# Patient Record
Sex: MALE | Race: WHITE | HISPANIC OR LATINO | Employment: FULL TIME | URBAN - METROPOLITAN AREA
[De-identification: names, ages, dates, MRNs, and addresses within clinical notes are randomized per-mention and may not be internally consistent; named-entity substitution may affect disease eponyms.]

---

## 2017-01-11 ENCOUNTER — HOSPITAL ENCOUNTER (OUTPATIENT)
Dept: RADIOLOGY | Facility: MEDICAL CENTER | Age: 62
End: 2017-01-11
Attending: PHYSICIAN ASSISTANT
Payer: COMMERCIAL

## 2017-01-11 DIAGNOSIS — R10.9 LEFT SIDED ABDOMINAL PAIN: ICD-10-CM

## 2017-01-11 PROCEDURE — 76705 ECHO EXAM OF ABDOMEN: CPT

## 2017-01-13 ENCOUNTER — TELEPHONE (OUTPATIENT)
Dept: MEDICAL GROUP | Facility: MEDICAL CENTER | Age: 62
End: 2017-01-13

## 2017-01-13 NOTE — TELEPHONE ENCOUNTER
----- Message from Julia Bello PA-C sent at 1/11/2017  5:51 PM PST -----  Cook Islander is 1st language.   Please inform pt that his ultrasound was completely normal.   Please ask him if he is still having any pain.   Thank you  Julia

## 2017-06-14 ENCOUNTER — HOSPITAL ENCOUNTER (OUTPATIENT)
Dept: LAB | Facility: MEDICAL CENTER | Age: 62
End: 2017-06-14
Attending: PHYSICIAN ASSISTANT
Payer: COMMERCIAL

## 2017-06-14 DIAGNOSIS — E78.5 DYSLIPIDEMIA: ICD-10-CM

## 2017-06-14 DIAGNOSIS — R74.8 ELEVATED ALKALINE PHOSPHATASE LEVEL: ICD-10-CM

## 2017-06-14 LAB
ALBUMIN SERPL BCP-MCNC: 3.8 G/DL (ref 3.2–4.9)
ALBUMIN/GLOB SERPL: 1.5 G/DL
ALP SERPL-CCNC: 114 U/L (ref 30–99)
ALT SERPL-CCNC: 17 U/L (ref 2–50)
ANION GAP SERPL CALC-SCNC: 5 MMOL/L (ref 0–11.9)
AST SERPL-CCNC: 15 U/L (ref 12–45)
BILIRUB SERPL-MCNC: 1.2 MG/DL (ref 0.1–1.5)
BUN SERPL-MCNC: 15 MG/DL (ref 8–22)
CALCIUM SERPL-MCNC: 9.4 MG/DL (ref 8.5–10.5)
CHLORIDE SERPL-SCNC: 108 MMOL/L (ref 96–112)
CHOLEST SERPL-MCNC: 200 MG/DL (ref 100–199)
CO2 SERPL-SCNC: 28 MMOL/L (ref 20–33)
CREAT SERPL-MCNC: 0.77 MG/DL (ref 0.5–1.4)
GFR SERPL CREATININE-BSD FRML MDRD: >60 ML/MIN/1.73 M 2
GGT SERPL-CCNC: 48 U/L (ref 7–51)
GLOBULIN SER CALC-MCNC: 2.6 G/DL (ref 1.9–3.5)
GLUCOSE SERPL-MCNC: 103 MG/DL (ref 65–99)
HDLC SERPL-MCNC: 57 MG/DL
LDLC SERPL CALC-MCNC: 112 MG/DL
POTASSIUM SERPL-SCNC: 4.2 MMOL/L (ref 3.6–5.5)
PROT SERPL-MCNC: 6.4 G/DL (ref 6–8.2)
SODIUM SERPL-SCNC: 141 MMOL/L (ref 135–145)
TRIGL SERPL-MCNC: 154 MG/DL (ref 0–149)

## 2017-06-14 PROCEDURE — 36415 COLL VENOUS BLD VENIPUNCTURE: CPT

## 2017-06-14 PROCEDURE — 82977 ASSAY OF GGT: CPT

## 2017-06-14 PROCEDURE — 80061 LIPID PANEL: CPT

## 2017-06-14 PROCEDURE — 80053 COMPREHEN METABOLIC PANEL: CPT

## 2017-06-15 ENCOUNTER — TELEPHONE (OUTPATIENT)
Dept: MEDICAL GROUP | Facility: MEDICAL CENTER | Age: 62
End: 2017-06-15

## 2017-06-15 NOTE — TELEPHONE ENCOUNTER
----- Message from Julia Bello PA-C sent at 6/15/2017  9:51 AM PDT -----  We will discuss his labs at his next appointment 6/21/17.  Thank you  Julia

## 2017-06-20 ENCOUNTER — TELEPHONE (OUTPATIENT)
Dept: MEDICAL GROUP | Facility: MEDICAL CENTER | Age: 62
End: 2017-06-20

## 2017-06-20 NOTE — TELEPHONE ENCOUNTER
ESTABLISHED PATIENT PRE-VISIT PLANNING     Note: Patient will not be contacted if there is no indication to call.     1.  Reviewed notes from the last few office visits within the medical group: Yes    2.  If any orders were placed at last visit or intended to be done for this visit (i.e. 6 mos follow-up), do we have Results/Consult Notes?        •  Labs - Labs ordered, completed and results are in chart.       •  Imaging - Imaging ordered, completed and results are in chart.       •  Referrals - No referrals were ordered at last office visit.    3. Is this appointment scheduled as a Hospital Follow-Up? No    4.  Immunizations were updated in Epic using WebIZ?: No WebIZ record       •  Web Iz Recommendations: n/a    5.  Patient is due for the following Health Maintenance Topics:   Health Maintenance Due   Topic Date Due   • IMM DTaP/Tdap/Td Vaccine (1 - Tdap) 06/16/1973   • COLONOSCOPY  06/16/2004   • IMM ZOSTER VACCINE  06/16/2014           6.  Patient was informed to arrive 15 min prior to their scheduled appointment and bring in their medication bottles.      Left msg for patient reminding them of appt. MA please ask about the HM topics during appt.

## 2017-06-21 ENCOUNTER — OFFICE VISIT (OUTPATIENT)
Dept: MEDICAL GROUP | Facility: MEDICAL CENTER | Age: 62
End: 2017-06-21
Payer: COMMERCIAL

## 2017-06-21 VITALS
WEIGHT: 130.73 LBS | BODY MASS INDEX: 21.78 KG/M2 | RESPIRATION RATE: 14 BRPM | HEART RATE: 78 BPM | HEIGHT: 65 IN | SYSTOLIC BLOOD PRESSURE: 110 MMHG | OXYGEN SATURATION: 96 % | DIASTOLIC BLOOD PRESSURE: 64 MMHG | TEMPERATURE: 98.3 F

## 2017-06-21 DIAGNOSIS — R74.8 ELEVATED ALKALINE PHOSPHATASE LEVEL: ICD-10-CM

## 2017-06-21 DIAGNOSIS — E78.5 DYSLIPIDEMIA: ICD-10-CM

## 2017-06-21 DIAGNOSIS — R73.01 ELEVATED FASTING GLUCOSE: ICD-10-CM

## 2017-06-21 PROCEDURE — 99214 OFFICE O/P EST MOD 30 MIN: CPT | Performed by: PHYSICIAN ASSISTANT

## 2017-06-21 ASSESSMENT — PAIN SCALES - GENERAL: PAINLEVEL: NO PAIN

## 2017-06-21 NOTE — PROGRESS NOTES
Subjective:     Chief Complaint   Patient presents with   • Follow-Up     labs     Mikal Bui is a 62 y.o. male here today for elevated alkaline phosphatase, elevated fasting blood glucose, cholesterol as listed below    Elevated alkaline phosphatase level  Results for MIKAL BUI (MRN [id number]) as of 6/21/2017 10:14   Ref. Range 5/30/2013 07:35 11/30/2016 11:10 6/14/2017 10:08   Alkaline Phosphatase Latest Ref Range: 30-99 U/L 129 (H) 104 (H) 114 (H)   6/14/17 GGT 48.   Otherwise normal LFTs.   Denies bone pain, joint pain, fever, chills, fatigue.     Elevated fasting glucose  First time elevated fasting blood glucose.  6/2017 fasting blood glucose 103  Per patient and brother he did increase his sugar intake she is having a lot more sweets throughout the day to help him stay motivated since he works from 6am to 11 PM most days of the week.   He is constantly moving and on his feet.  Otherwise his diet is pretty healthy other than his sweet tooth.  Denies polyuria, polydipsia, polyphagia    Dyslipidemia  Results for MIKAL BUI (MRN [id number]) as of 6/21/2017 10:14   Ref. Range 11/30/2016 11:10 6/14/2017 10:08   Cholesterol,Tot Latest Ref Range: 100-199 mg/dL 230 (H) 200 (H)   Triglycerides Latest Ref Range: 0-149 mg/dL 204 (H) 154 (H)   HDL Latest Ref Range: >=40 mg/dL 61 57   LDL Latest Ref Range: <100 mg/dL 128 (H) 112 (H)   Per patient and brother he did increase his sugar intake she is having a lot more sweets throughout the day to help him stay motivated since he works from 6am to 11 PM most days of the week.   He is constantly moving and on his feet.  Otherwise his diet is pretty healthy other than his sweet tooth.  Denies chest pain, shortness of breath, lightheadedness, muscle cramping           Current medicines (including changes today)  Current Outpatient Prescriptions   Medication Sig Dispense Refill   • predniSONE (DELTASONE) 20 MG TABS Take with food 21  "Tab 0   • Pseudoeph-Doxylamine-DM-APAP (NYQUIL MULTI-SYMPTOM PO) Take  by mouth.       • Naproxen Sodium (ALEVE PO) Take  by mouth.       • azithromycin (ZITHROMAX) 250 MG TABS 2 tabs by mouth day 1, 1 tab by mouth days 2-5 6 Tab 0   • guaifenesin-codeine (ROBITUSSIN AC) SOLN Take 5 mL by mouth every 6 hours as needed for Cough. 100 mL 0     No current facility-administered medications for this visit.     He  has a past medical history of Hyperlipidemia. He also has no past medical history of Hypertension or Diabetes.    ROS   No chest pain, no shortness of breath, no abdominal pain       Objective:     Blood pressure 110/64, pulse 78, temperature 36.8 °C (98.3 °F), resp. rate 14, height 1.651 m (5' 5\"), weight 59.3 kg (130 lb 11.7 oz), SpO2 96 %. Body mass index is 21.76 kg/(m^2).   Physical Exam:  Alert, oriented in no acute distress.  Eye contact is good, speech goal directed, affect calm  HEENT: conjunctiva non-injected, sclera non-icteric, PERRL.  Neck No adenopathy or masses in the neck or supraclavicular regions.  Lungs: clear to auscultation bilaterally with good excursion.  CV: regular rate and rhythm.  Abdomen: soft, nontender, no HSM, No CVAT  Ext: no edema, color normal, peripheral pulses 2+, temperature normal      Assessment and Plan:   The following treatment plan was discussed     1. Elevated alkaline phosphatase level  Persistent with normal ggt.   Ordered some labs and referral for endocrinology   - COMP METABOLIC PANEL; Future  - TSH; Future  - FREE THYROXINE; Future  - ALK PHOS, BONE SPEC.; Future  - REFERRAL TO ENDOCRINOLOGY    2. Dyslipidemia  Improved, continue working on diet and exercise, discussed decreasing sweets way down.     3. Elevated fasting glucose  New diagnosis, first-time elevated, labs ordered, will call for results  continue working on diet and exercise, discussed decreasing sweets way down.   - COMP METABOLIC PANEL; Future  - HEMOGLOBIN A1C; Future      Followup: Return in " about 6 months (around 12/21/2017) for annual.           Please note that this dictation was created using voice recognition software. I have made every reasonable attempt to correct obvious errors, but I expect that there are errors of grammar and possibly content that I did not discover before finalizing the note.

## 2017-06-21 NOTE — MR AVS SNAPSHOT
"        Mikal Bhardwaj-Frederic   2017 9:40 AM   Office Visit   MRN: [id number]    Department:  [place] [address]   Dept Phone:  [phone]    Description:  Male : 1955   Provider:  Julia Bello PA-C           Reason for Visit     Follow-Up labs      Allergies as of 2017     Allergen Noted Reactions    Pcn [Penicillins] 2016   Diarrhea    Abdominal discomfort      You were diagnosed with     Elevated alkaline phosphatase level   [861638]       Dyslipidemia   [435190]       Elevated fasting glucose   [789259]         Vital Signs     Blood Pressure Pulse Temperature Respirations Height Weight    110/64 mmHg 78 36.8 °C (98.3 °F) 14 1.651 m (5' 5\") 59.3 kg (130 lb 11.7 oz)    Body Mass Index Oxygen Saturation Smoking Status             21.76 kg/m2 96% Current Every Day Smoker         Basic Information     Date Of Birth Sex Race Ethnicity Preferred Language    1955 Male  or   Origin (Croatian,Canadian,Stateless,Jordanian, etc) English      Problem List              ICD-10-CM Priority Class Noted - Resolved    Left sided abdominal pain R10.9   2016 - Present    Dyslipidemia E78.5   2016 - Present    Elevated alkaline phosphatase level R74.8   2017 - Present    Elevated fasting glucose R73.01   2017 - Present      Health Maintenance        Date Due Completion Dates    IMM DTaP/Tdap/Td Vaccine (1 - Tdap) 1973 ---    COLONOSCOPY 2004 ---    IMM ZOSTER VACCINE 2014 ---            Current Immunizations     No immunizations on file.      Below and/or attached are the medications your provider expects you to take. Review all of your home medications and newly ordered medications with your provider and/or pharmacist. Follow medication instructions as directed by your provider and/or pharmacist. Please keep your medication list with you and share with your provider. Update the information when medications are discontinued, doses are " changed, or new medications (including over-the-counter products) are added; and carry medication information at all times in the event of emergency situations     Allergies:  PCN - Diarrhea               Medications  Valid as of: June 21, 2017 - 10:42 AM    Generic Name Brand Name Tablet Size Instructions for use    Azithromycin (Tab) ZITHROMAX 250 MG 2 tabs by mouth day 1, 1 tab by mouth days 2-5        Guaifenesin-Codeine (Solution) ROBITUSSIN -10 mg/5mL Take 5 mL by mouth every 6 hours as needed for Cough.        Naproxen Sodium   Take  by mouth.          PredniSONE (Tab) DELTASONE 20 MG Take with food        Pseudoeph-Doxylamine-DM-APAP   Take  by mouth.          .                 Medicines prescribed today were sent to:     [facility] [address] [place] [address], [place] - [address] [address]    Phone: [phone] Fax: [phone]    Open 24 Hours?: No      Medication refill instructions:       If your prescription bottle indicates you have medication refills left, it is not necessary to call your provider’s office. Please contact your pharmacy and they will refill your medication.    If your prescription bottle indicates you do not have any refills left, you may request refills at any time through one of the following ways: The online [employer] system (except Urgent Care), by calling your provider’s office, or by asking your pharmacy to contact your provider’s office with a refill request. Medication refills are processed only during regular business hours and may not be available until the next business day. Your provider may request additional information or to have a follow-up visit with you prior to refilling your medication.   *Please Note: Medication refills are assigned a new Rx number when refilled electronically. Your pharmacy may indicate that no refills were authorized even though a new prescription for the same medication is available at the pharmacy. Please  request the medicine by name with the pharmacy before contacting your provider for a refill.        Your To Do List     Future Labs/Procedures Complete By Expires    ALK PHOS, BONE SPEC.  As directed 6/21/2018    COMP METABOLIC PANEL  As directed 6/22/2018    FREE THYROXINE  As directed 6/22/2018    HEMOGLOBIN A1C  As directed 6/22/2018    TSH  As directed 6/22/2018      Referral     A referral request has been sent to our patient care coordination department. Please allow 3-5 business days for us to process this request and contact you either by phone or mail. If you do not hear from us by the 5th business day, please call us at [phone].           MyChart Status: Patient Declined        Quit Tobacco Information     Do you want to quit using tobacco?    Quitting tobacco decreases risks of cancer, heart and lung disease, increases life expectancy, improves sense of taste and smell, and increases spending money, among other benefits.    If you are thinking about quitting, we can help.  • [facility] Quit Tobacco Program: 349.886.2431  o Program occurs weekly for four weeks and includes pharmacist consultation on products to support quitting smoking or chewing tobacco. A provider referral is needed for pharmacist consultation.  • Tobacco Users Help Hotline: 9-214-QUIT-NOW (250-1206) or https://[place].quitlogix.org/  o Free, confidential telephone and online coaching for [place] residents. Sessions are designed on a schedule that is convenient for you. Eligible clients receive free nicotine replacement therapy.  • Nationally: www.smokefree.gov  o Information and professional assistance to support both immediate and long-term needs as you become, and remain, a non-smoker. Smokefree.gov allows you to choose the help that best fits your needs.

## 2017-06-21 NOTE — ASSESSMENT & PLAN NOTE
Results for LORENA BUI (MRN [id number]) as of 6/21/2017 10:14   Ref. Range 11/30/2016 11:10 6/14/2017 10:08   Cholesterol,Tot Latest Ref Range: 100-199 mg/dL 230 (H) 200 (H)   Triglycerides Latest Ref Range: 0-149 mg/dL 204 (H) 154 (H)   HDL Latest Ref Range: >=40 mg/dL 61 57   LDL Latest Ref Range: <100 mg/dL 128 (H) 112 (H)   Per patient and brother he did increase his sugar intake she is having a lot more sweets throughout the day to help him stay motivated since he works from 6am to 11 PM most days of the week.   He is constantly moving and on his feet.  Otherwise his diet is pretty healthy other than his sweet tooth.  Denies chest pain, shortness of breath, lightheadedness, muscle cramping

## 2017-06-21 NOTE — ASSESSMENT & PLAN NOTE
Results for LORENA BUI (MRN [id number]) as of 6/21/2017 10:14   Ref. Range 5/30/2013 07:35 11/30/2016 11:10 6/14/2017 10:08   Alkaline Phosphatase Latest Ref Range: 30-99 U/L 129 (H) 104 (H) 114 (H)   6/14/17 GGT 48.   Otherwise normal LFTs.   Denies bone pain, joint pain, fever, chills, fatigue.

## 2017-06-21 NOTE — ASSESSMENT & PLAN NOTE
First time elevated fasting blood glucose.  6/2017 fasting blood glucose 103  Per patient and brother he did increase his sugar intake she is having a lot more sweets throughout the day to help him stay motivated since he works from 6am to 11 PM most days of the week.   He is constantly moving and on his feet.  Otherwise his diet is pretty healthy other than his sweet tooth.  Denies polyuria, polydipsia, polyphagia

## 2017-08-23 ENCOUNTER — HOSPITAL ENCOUNTER (OUTPATIENT)
Dept: LAB | Facility: MEDICAL CENTER | Age: 62
End: 2017-08-23
Attending: INTERNAL MEDICINE
Payer: COMMERCIAL

## 2017-08-23 ENCOUNTER — OFFICE VISIT (OUTPATIENT)
Dept: ENDOCRINOLOGY | Facility: MEDICAL CENTER | Age: 62
End: 2017-08-23
Payer: COMMERCIAL

## 2017-08-23 VITALS
SYSTOLIC BLOOD PRESSURE: 108 MMHG | HEART RATE: 71 BPM | WEIGHT: 128 LBS | DIASTOLIC BLOOD PRESSURE: 72 MMHG | OXYGEN SATURATION: 97 % | BODY MASS INDEX: 21.33 KG/M2 | HEIGHT: 65 IN

## 2017-08-23 DIAGNOSIS — E55.9 VITAMIN D DEFICIENCY: ICD-10-CM

## 2017-08-23 DIAGNOSIS — E53.8 VITAMIN B12 DEFICIENCY: ICD-10-CM

## 2017-08-23 DIAGNOSIS — R74.8 ELEVATED ALKALINE PHOSPHATASE LEVEL: ICD-10-CM

## 2017-08-23 LAB
25(OH)D3 SERPL-MCNC: 9 NG/ML (ref 30–100)
VIT B12 SERPL-MCNC: 209 PG/ML (ref 211–911)

## 2017-08-23 PROCEDURE — 82306 VITAMIN D 25 HYDROXY: CPT

## 2017-08-23 PROCEDURE — 36415 COLL VENOUS BLD VENIPUNCTURE: CPT

## 2017-08-23 PROCEDURE — 84080 ASSAY ALKALINE PHOSPHATASES: CPT

## 2017-08-23 PROCEDURE — 84075 ASSAY ALKALINE PHOSPHATASE: CPT

## 2017-08-23 PROCEDURE — 99204 OFFICE O/P NEW MOD 45 MIN: CPT | Performed by: INTERNAL MEDICINE

## 2017-08-23 PROCEDURE — 82607 VITAMIN B-12: CPT

## 2017-08-23 NOTE — PROGRESS NOTES
"New Patient Consult Note  Primary care physician: Julia Bello PA-C    Reason for consult: Elevated alkaline phosphatase    HPI:  Mikal Rushing is a 62 y.o. old patient who comes in today for evaluation of elevated alkaline phosphatase. GGT seems to be normal and so it appears that this alkaline phosphatase is coming from sources other than the liver. He denies any other complaints. He smokes about 4-5 cigarettes daily.    ROS:  Constitutional: No weight loss  Cardiac: No palpitations or racing heart  Resp: No shortness of breath  Neuro: No numbness or tinging in feet  Endo: No heat or cold intolerance, no polyuria or polydipsia  All other systems were reviewed and were negative.    Past Medical History:  Patient Active Problem List    Diagnosis Date Noted   • Elevated alkaline phosphatase level 06/21/2017   • Elevated fasting glucose 06/21/2017   • Dyslipidemia 12/07/2016   • Left sided abdominal pain 11/23/2016       Past Surgical History:  History reviewed. No pertinent past surgical history.    Allergies:  Pcn    Social History:  Social History     Social History   • Marital Status: Single     Spouse Name: N/A   • Number of Children: N/A   • Years of Education: N/A     Occupational History   • Not on file.     Social History Main Topics   • Smoking status: Current Every Day Smoker -- 0.50 packs/day for 20 years     Types: Cigarettes   • Smokeless tobacco: Never Used   • Alcohol Use: Yes      Comment: occ   • Drug Use: No   • Sexual Activity: Not on file     Other Topics Concern   • Not on file     Social History Narrative       Family History:  Family History   Problem Relation Age of Onset   • Hypertension Mother    • Diabetes Father    • Diabetes Sister    • Diabetes Brother    • Diabetes Brother    • Diabetes Brother        Medications:  No current outpatient prescriptions on file.    Labs: Reviewed    Physical Examination:  Vital signs: /72 mmHg  Pulse 71  Ht 1.651 m (5' 5\")  Wt " 58.06 kg (128 lb)  BMI 21.30 kg/m2  SpO2 97% Body mass index is 21.3 kg/(m^2).  General: No apparent distress, cooperative  Eyes: No scleral icterus or discharge  ENMT: Normal on external inspection of nose, lips, normal thyroid exam  Neck: No abnormal masses on inspection  Resp: Normal effort, clear to auscultation bilaterally   CVS: Regular rate and rhythm, S1 S2 normal, no murmur   Extremities: No edema  Abdomen: no abdominal obesity present  Neuro: Alert and oriented  Skin: No rash  Psych: Normal mood and affect, intact memory and able to make informed decisions    Assessment and Plan:    1. Elevated alkaline phosphatase level  We will do  - ALKALINE PHOSPHATASE ISOENZYMES for further evaluation. Rule out vitamin D deficiency as a contributory factor for elevated alkaline phosphatase.    2. Vitamin D deficiency  Plan as per above.    3. Vitamin B12 deficiency  Rule out vitamin B12 deficiency.    4. Smoking Cessation: Smoking cessation was discussed with the patient. He is agreeable to try to quit. I have advised him to discuss smoking cessation plan in detail with Dr. Bello.      Return in about 2 months (around 10/23/2017).     Thank you for allowing me to participate in the care of this patient.    Oniel Jacobsen M.D.  08/23/2017    CC:   Julia Bello PA-C    This note was created using voice recognition software (Dragon). The accuracy of the dictation is limited by the abilities of the software. I have reviewed the note prior to signing, however some errors in grammar and context are still possible. If you have any questions related to this note please do not hesitate to contact our office.

## 2017-08-23 NOTE — MR AVS SNAPSHOT
"        Mikal Bhardwaj-Frederic   2017 9:30 AM   Office Visit   MRN: [id number]    Department:  End[facility]inology [facility]   Dept Phone:  [phone]    Description:  Male : 1955   Provider:  Oniel Jacobsen M.D.           Allergies as of 2017     Allergen Noted Reactions    Pcn [Penicillins] 2016   Diarrhea    Abdominal discomfort      You were diagnosed with     Elevated alkaline phosphatase level   [389118]       Vitamin D deficiency   [6804012]       Vitamin B12 deficiency   [076605]         Vital Signs     Blood Pressure Pulse Height Weight Body Mass Index Oxygen Saturation    108/72 mmHg 71 1.651 m (5' 5\") 58.06 kg (128 lb) 21.30 kg/m2 97%    Smoking Status                   Current Every Day Smoker           Basic Information     Date Of Birth Sex Race Ethnicity Preferred Language    1955 Male  or   Origin (Austrian,Montserratian,Northern Irish,Austrian, etc) English      Your appointments     2017  9:50 AM   Established Patient with Oniel Jacobsen M.D.   [facility]    [address], [address]  [place] [address] [phone]           You will be receiving a confirmation call a few days before your appointment from our automated call confirmation system.              Problem List              ICD-10-CM Priority Class Noted - Resolved    Left sided abdominal pain R10.9   2016 - Present    Dyslipidemia E78.5   2016 - Present    Elevated alkaline phosphatase level R74.8   2017 - Present    Elevated fasting glucose R73.01   2017 - Present      Health Maintenance        Date Due Completion Dates    IMM DTaP/Tdap/Td Vaccine (1 - Tdap) 1973 ---    COLONOSCOPY 2004 ---    IMM ZOSTER VACCINE 2014 ---    IMM INFLUENZA (1) 2017 ---            Current Immunizations     No immunizations on file.      Below and/or attached are the medications your provider expects you to take. Review " all of your home medications and newly ordered medications with your provider and/or pharmacist. Follow medication instructions as directed by your provider and/or pharmacist. Please keep your medication list with you and share with your provider. Update the information when medications are discontinued, doses are changed, or new medications (including over-the-counter products) are added; and carry medication information at all times in the event of emergency situations     Allergies:  PCN - Diarrhea               Medications  Valid as of: August 23, 2017 -  9:37 AM    Generic Name Brand Name Tablet Size Instructions for use    .                 Medicines prescribed today were sent to:     [id number] - [place], [place] - [id number] [facility] [place] [address]    Phone: [phone] Fax: [phone]    Open 24 Hours?: No      Medication refill instructions:       If your prescription bottle indicates you have medication refills left, it is not necessary to call your provider’s office. Please contact your pharmacy and they will refill your medication.    If your prescription bottle indicates you do not have any refills left, you may request refills at any time through one of the following ways: The online [employer] system (except Urgent Care), by calling your provider’s office, or by asking your pharmacy to contact your provider’s office with a refill request. Medication refills are processed only during regular business hours and may not be available until the next business day. Your provider may request additional information or to have a follow-up visit with you prior to refilling your medication.   *Please Note: Medication refills are assigned a new Rx number when refilled electronically. Your pharmacy may indicate that no refills were authorized even though a new prescription for the same medication is available at the pharmacy. Please request the medicine by name with the pharmacy  before contacting your provider for a refill.        Your To Do List     Future Labs/Procedures Complete By Expires    ALKALINE PHOSPHATASE ISOENZYMES  As directed 8/23/2018    VITAMIN B12  As directed 8/23/2018    VITAMIN D,25 HYDROXY  As directed 8/23/2018         MyChart Status: Patient Declined        Quit Tobacco Information     Do you want to quit using tobacco?    Quitting tobacco decreases risks of cancer, heart and lung disease, increases life expectancy, improves sense of taste and smell, and increases spending money, among other benefits.    If you are thinking about quitting, we can help.  • [facility] Quit Tobacco Program: 149.678.4799  o Program occurs weekly for four weeks and includes pharmacist consultation on products to support quitting smoking or chewing tobacco. A provider referral is needed for pharmacist consultation.  • Tobacco Users Help Hotline: 0-590-QUITNOW (334-3112) or https://[place].quitlogix.org/  o Free, confidential telephone and online coaching for [place] residents. Sessions are designed on a schedule that is convenient for you. Eligible clients receive free nicotine replacement therapy.  • Nationally: www.smokefree.gov  o Information and professional assistance to support both immediate and long-term needs as you become, and remain, a non-smoker. Smokefree.gov allows you to choose the help that best fits your needs.

## 2017-08-24 DIAGNOSIS — E53.8 VITAMIN B12 DEFICIENCY: ICD-10-CM

## 2017-08-24 DIAGNOSIS — E55.9 VITAMIN D DEFICIENCY: ICD-10-CM

## 2017-08-24 RX ORDER — ERGOCALCIFEROL 1.25 MG/1
50000 CAPSULE ORAL
Qty: 18 CAP | Refills: 0 | Status: SHIPPED | OUTPATIENT
Start: 2017-08-24 | End: 2017-10-19

## 2017-08-24 RX ORDER — CYANOCOBALAMIN 1000 UG/ML
1000 INJECTION, SOLUTION INTRAMUSCULAR; SUBCUTANEOUS
Status: DISCONTINUED | OUTPATIENT
Start: 2017-08-25 | End: 2018-07-11

## 2017-08-25 LAB
ALP BONE SERPL-CCNC: 48 U/L (ref 0–55)
ALP ISOS SERPL HS-CCNC: 0 U/L
ALP LIVER SERPL-CCNC: 89 U/L (ref 0–94)
ALP SERPL-CCNC: 137 U/L (ref 40–120)

## 2017-08-25 NOTE — PROGRESS NOTES
Spoke to the patient and informed him the results. To  vit d from [facility] pharmacy and to schedule B12 injections.

## 2017-08-29 ENCOUNTER — NON-PROVIDER VISIT (OUTPATIENT)
Dept: ENDOCRINOLOGY | Facility: MEDICAL CENTER | Age: 62
End: 2017-08-29
Payer: COMMERCIAL

## 2017-08-29 DIAGNOSIS — E78.5 DYSLIPIDEMIA: ICD-10-CM

## 2017-08-29 PROCEDURE — 96372 THER/PROPH/DIAG INJ SC/IM: CPT | Performed by: INTERNAL MEDICINE

## 2017-08-29 RX ADMIN — CYANOCOBALAMIN 1000 MCG: 1000 INJECTION, SOLUTION INTRAMUSCULAR; SUBCUTANEOUS at 13:27

## 2018-07-11 ENCOUNTER — OFFICE VISIT (OUTPATIENT)
Dept: MEDICAL GROUP | Facility: MEDICAL CENTER | Age: 63
End: 2018-07-11
Payer: COMMERCIAL

## 2018-07-11 VITALS
BODY MASS INDEX: 21.33 KG/M2 | HEART RATE: 78 BPM | DIASTOLIC BLOOD PRESSURE: 64 MMHG | HEIGHT: 65 IN | TEMPERATURE: 98.7 F | WEIGHT: 128 LBS | SYSTOLIC BLOOD PRESSURE: 128 MMHG | OXYGEN SATURATION: 98 %

## 2018-07-11 DIAGNOSIS — R73.01 ELEVATED FASTING GLUCOSE: ICD-10-CM

## 2018-07-11 DIAGNOSIS — R74.8 ELEVATED ALKALINE PHOSPHATASE LEVEL: ICD-10-CM

## 2018-07-11 DIAGNOSIS — Z13.220 ENCOUNTER FOR LIPID SCREENING FOR CARDIOVASCULAR DISEASE: ICD-10-CM

## 2018-07-11 DIAGNOSIS — Z12.11 COLON CANCER SCREENING: ICD-10-CM

## 2018-07-11 DIAGNOSIS — Z13.6 ENCOUNTER FOR LIPID SCREENING FOR CARDIOVASCULAR DISEASE: ICD-10-CM

## 2018-07-11 DIAGNOSIS — Z00.00 ANNUAL PHYSICAL EXAM: ICD-10-CM

## 2018-07-11 DIAGNOSIS — Z76.89 ENCOUNTER TO ESTABLISH CARE WITH NEW DOCTOR: ICD-10-CM

## 2018-07-11 DIAGNOSIS — E78.5 DYSLIPIDEMIA: ICD-10-CM

## 2018-07-11 DIAGNOSIS — Z13.1 DIABETES MELLITUS SCREENING: ICD-10-CM

## 2018-07-11 PROCEDURE — 99214 OFFICE O/P EST MOD 30 MIN: CPT | Performed by: FAMILY MEDICINE

## 2018-07-11 ASSESSMENT — PATIENT HEALTH QUESTIONNAIRE - PHQ9: CLINICAL INTERPRETATION OF PHQ2 SCORE: 0

## 2018-07-12 NOTE — ASSESSMENT & PLAN NOTE
Chronic, unknown clinic control.  Patient had elevated fasting blood glucose in the past.    ROS is NEGATIVE for blurred vision, polydipsia, polyuria, diaphoresis, palpitations, fatigue, irritability, flank pain, BLE paresthesias.    normal...

## 2018-07-12 NOTE — PROGRESS NOTES
Subjective:   Chief Complaint/History of Present Illness:  Mikal Rushing is a 63 y.o. male established patient who presents today to discuss medical problems as listed below    Diagnoses of Encounter to establish care with new doctor, Dyslipidemia, Encounter for lipid screening for cardiovascular disease, Elevated alkaline phosphatase level, Annual physical exam, Colon cancer screening, Elevated fasting glucose, and Diabetes mellitus screening were pertinent to this visit.    Dyslipidemia  Patient and I discussed recent labs (see below; mixed dyslipidemia, uncontrolled) and that ASCVD risk is increased based on most recent lipid panel, current blood pressure (non-hypertensive), diabetes status (non-diabetic), and smoking status (non-smoker).    Patient and I then discussed necessary dietary changes to make to address dyslipidemia.  Patient is NOT currently taking cholesterol lowering medication.  Patient verbalized understanding.    ROS is NEGATIVE for dizziness, generalized weakness/fatigue, vision/hearing changes, jaw pain/paresthesias, BUE pain/paresthesias/numbness/weakness, chest pain/pressure, palpitations, dyspnea, RUQ abdominal pain, oliguria/anuria, BLE edema.    Lab Results   Component Value Date/Time    CHOLSTRLTOT 200 (H) 06/14/2017 10:08 AM     (H) 06/14/2017 10:08 AM    HDL 57 06/14/2017 10:08 AM    TRIGLYCERIDE 154 (H) 06/14/2017 10:08 AM       Elevated alkaline phosphatase level  Patient and I discussed that he previously had elevated alkaline phosphatase.  This was further evaluated by the endocrinologist with alkaline phosphatase isoenzymes.  Essentially, patient's liver portion of alkaline phosphatase was within normal limits, and his bone portion of isoenzymes was also within normal limits.  However, combined, patient was mildly above normal limits.    Therefore, patient I discussed that, as long as his overall alkaline phosphatase levels are barely above normal limits, that  "he should be fine.    Patient denies binge or regular&heavy alcohol drinking behaviors, denies IV drug use, denies recent sexual intercourse with new partners.    ROS is NEGATIVE for generalized weakness/fatigue, generalized pruritis, jaundice, RUQ pain.      Elevated fasting glucose  Chronic, unknown clinic control.  Patient had elevated fasting blood glucose in the past.    ROS is NEGATIVE for blurred vision, polydipsia, polyuria, diaphoresis, palpitations, fatigue, irritability, flank pain, BLE paresthesias.       Patient Active Problem List    Diagnosis Date Noted   • Elevated alkaline phosphatase level 06/21/2017   • Elevated fasting glucose 06/21/2017   • Dyslipidemia 12/07/2016   • Left sided abdominal pain 11/23/2016       Additional History:   Allergies:    Pcn [penicillins]     Current Medications:     No current outpatient prescriptions on file.     No current facility-administered medications for this visit.         Social History:     Social History   Substance Use Topics   • Smoking status: Current Every Day Smoker     Packs/day: 0.50     Years: 20.00     Types: Cigarettes   • Smokeless tobacco: Current User     Types: Chew   • Alcohol use 0.6 oz/week     1 Glasses of wine per week       ROS:     - NOTE: All other systems reviewed and are negative, except as in HPI.     Objective:   Physical Exam:    Vitals: Blood pressure 128/64, pulse 78, temperature 37.1 °C (98.7 °F), height 1.651 m (5' 5\"), weight 58.1 kg (128 lb), SpO2 98 %.   BMI: Body mass index is 21.3 kg/m².   General/Constitutional: Vitals as above, Well nourished, well developed male in no acute distress   Head/Eyes: Head is grossly normal & atraumatic, bilateral conjunctivae clear and not injected, bilateral EOMI, bilateral PERRL   ENT: Bilateral external ears grossly normal in appearance, Hearing grossly intact, External nares normal in appearance and without discharge/bleeding   Respiratory: No respiratory distress, bilateral lungs are " clear to ausculation in all lung fields (anterior/lateral/posterior), no wheezing/rhonchi/rales   Cardiovascular: Regular rate and rhythm without murmur/gallops/rubs, distal pulses are intact and equal bilaterally (radial, posterior tibial), no bilateral lower extremity edema   MSK: Gait grossly normal & not antalgic   Integumentary: No apparent rashes   Psych: Judgment grossly appropriate, no apparent depression/anxiety    Health Maintenance:     -Ordered fit for colon cancer screening    Imaging/Labs:     -Reviewed and interpreted as in HPI above    Assessment and Plan:   1. Encounter to establish care with new doctor  Patient transferring primary medical care to me from [facility], as his previous PCP has left [facility].    2. Dyslipidemia  3. Encounter for lipid screening for cardiovascular disease  Chronic, uncontrolled, recheck Lipid profile   - LIPID PROFILE; Future    4. Elevated alkaline phosphatase level  Chronic, unknown control, recheck CMP.   - COMP METABOLIC PANEL; Future    5. Annual physical exam  6. Colon cancer screening  7. Elevated fasting glucose  8. Diabetes mellitus screening  Ordered for annual medical exam purposes   - OCCULT BLOOD FECES IMMUNOASSAY (FIT); Future   - HEMOGLOBIN A1C; Future      RTC: in 1-2months for Annual Medical Exam.    PLEASE NOTE: This dictation was created using voice recognition software. I have made every reasonable attempt to correct obvious errors, but I expect that there are errors of grammar and possibly content that I did not discover before finalizing the note.

## 2018-07-12 NOTE — ASSESSMENT & PLAN NOTE
Patient and I discussed recent labs (see below; mixed dyslipidemia, uncontrolled) and that ASCVD risk is increased based on most recent lipid panel, current blood pressure (non-hypertensive), diabetes status (non-diabetic), and smoking status (non-smoker).    Patient and I then discussed necessary dietary changes to make to address dyslipidemia.  Patient is NOT currently taking cholesterol lowering medication.  Patient verbalized understanding.    ROS is NEGATIVE for dizziness, generalized weakness/fatigue, vision/hearing changes, jaw pain/paresthesias, BUE pain/paresthesias/numbness/weakness, chest pain/pressure, palpitations, dyspnea, RUQ abdominal pain, oliguria/anuria, BLE edema.    Lab Results   Component Value Date/Time    CHOLSTRLTOT 200 (H) 06/14/2017 10:08 AM     (H) 06/14/2017 10:08 AM    HDL 57 06/14/2017 10:08 AM    TRIGLYCERIDE 154 (H) 06/14/2017 10:08 AM

## 2018-07-12 NOTE — ASSESSMENT & PLAN NOTE
Patient and I discussed that he previously had elevated alkaline phosphatase.  This was further evaluated by the endocrinologist with alkaline phosphatase isoenzymes.  Essentially, patient's liver portion of alkaline phosphatase was within normal limits, and his bone portion of isoenzymes was also within normal limits.  However, combined, patient was mildly above normal limits.    Therefore, patient I discussed that, as long as his overall alkaline phosphatase levels are barely above normal limits, that he should be fine.    Patient denies binge or regular&heavy alcohol drinking behaviors, denies IV drug use, denies recent sexual intercourse with new partners.    ROS is NEGATIVE for generalized weakness/fatigue, generalized pruritis, jaundice, RUQ pain.

## 2018-08-14 ENCOUNTER — HOSPITAL ENCOUNTER (OUTPATIENT)
Facility: MEDICAL CENTER | Age: 63
End: 2018-08-14
Attending: FAMILY MEDICINE
Payer: COMMERCIAL

## 2018-08-14 ENCOUNTER — TELEPHONE (OUTPATIENT)
Dept: MEDICAL GROUP | Facility: MEDICAL CENTER | Age: 63
End: 2018-08-14

## 2018-08-14 PROCEDURE — 82274 ASSAY TEST FOR BLOOD FECAL: CPT

## 2018-08-14 NOTE — TELEPHONE ENCOUNTER
ESTABLISHED PATIENT PRE-VISIT PLANNING     Note: Patient will not be contacted if there is no indication to call.     1.  Reviewed notes from the last few office visits within the medical group: Yes  07/11/2018  2.  If any orders were placed at last visit or intended to be done for this visit (i.e. 6 mos follow-up), do we have Results/Consult Notes?        •  Labs - Patient coming in for just annual    Note: If patient appointment is for lab review and patient did not complete labs, check with provider if OK to reschedule patient until labs completed.       •  Imaging - Imaging was not ordered at last office visit.       •  Referrals - No referrals were ordered at last office visit.    3. Is this appointment scheduled as a Hospital Follow-Up? No    4.  Immunizations were updated in Epic using WebIZ?: No WebIZ record       •  Web Iz Recommendations:     5.  Patient is due for the following Health Maintenance Topics:   Health Maintenance Due   Topic Date Due   • IMM DTaP/Tdap/Td Vaccine (1 - Tdap) 06/16/1973   • IMM PNEUMOCOCCAL 19-64 (ADULT) MEDIUM RISK SERIES (1 of 1 - PPSV23) 06/16/1973   • COLONOSCOPY /Fit test ordered  06/16/2004   • IMM ZOSTER VACCINES (1 of 2) 06/16/2004       6.  MDX printed for Provider? NO    7.  Patient was NOT informed to arrive 15 min prior to their scheduled appointment and bring in their medication bottles.

## 2018-08-20 DIAGNOSIS — Z12.11 COLON CANCER SCREENING: ICD-10-CM

## 2018-08-20 LAB — AMBIGUOUS DTTM AMBI4: NORMAL

## 2018-08-21 LAB — HEMOCCULT STL QL IA: NEGATIVE

## 2018-08-22 ENCOUNTER — OFFICE VISIT (OUTPATIENT)
Dept: MEDICAL GROUP | Facility: MEDICAL CENTER | Age: 63
End: 2018-08-22
Payer: COMMERCIAL

## 2018-08-22 VITALS
HEART RATE: 84 BPM | HEIGHT: 65 IN | WEIGHT: 131.8 LBS | TEMPERATURE: 98.4 F | BODY MASS INDEX: 21.96 KG/M2 | DIASTOLIC BLOOD PRESSURE: 64 MMHG | OXYGEN SATURATION: 99 % | SYSTOLIC BLOOD PRESSURE: 112 MMHG

## 2018-08-22 DIAGNOSIS — R73.01 ELEVATED FASTING GLUCOSE: ICD-10-CM

## 2018-08-22 DIAGNOSIS — Z00.00 ANNUAL PHYSICAL EXAM: Primary | ICD-10-CM

## 2018-08-22 DIAGNOSIS — E78.5 DYSLIPIDEMIA: ICD-10-CM

## 2018-08-22 DIAGNOSIS — Z23 NEED FOR VACCINATION: ICD-10-CM

## 2018-08-22 DIAGNOSIS — R74.8 ELEVATED ALKALINE PHOSPHATASE LEVEL: ICD-10-CM

## 2018-08-22 PROCEDURE — 90732 PPSV23 VACC 2 YRS+ SUBQ/IM: CPT | Performed by: FAMILY MEDICINE

## 2018-08-22 PROCEDURE — 90472 IMMUNIZATION ADMIN EACH ADD: CPT | Performed by: FAMILY MEDICINE

## 2018-08-22 PROCEDURE — 99396 PREV VISIT EST AGE 40-64: CPT | Mod: 25 | Performed by: FAMILY MEDICINE

## 2018-08-22 PROCEDURE — 90471 IMMUNIZATION ADMIN: CPT | Performed by: FAMILY MEDICINE

## 2018-08-22 PROCEDURE — 90715 TDAP VACCINE 7 YRS/> IM: CPT | Performed by: FAMILY MEDICINE

## 2018-08-27 NOTE — ASSESSMENT & PLAN NOTE
Chronic, unknown control, patient did not get labs from last time.  However, patient will get labs for review.

## 2018-08-27 NOTE — ASSESSMENT & PLAN NOTE
Patient and I discussed recent labs (see below; mixed dyslipidemia, uncontrolled) and that ASCVD risk is increased based on most recent lipid panel, current blood pressure (non-hypertensive, without medication), diabetes status (non-diabetic), and smoking status (no-smoker).    Patient and I then discussed necessary dietary changes to make to address dyslipidemia.  Patient is NOT currently taking cholesterol lowering medication.  Patient verbalized understanding.    ROS is NEGATIVE for dizziness, generalized weakness/fatigue, vision/hearing changes, jaw pain/paresthesias, BUE pain/paresthesias/numbness/weakness, chest pain/pressure, palpitations, dyspnea, RUQ abdominal pain, oliguria/anuria, BLE edema.    Lab Results   Component Value Date/Time    CHOLSTRLTOT 200 (H) 06/14/2017 10:08 AM     (H) 06/14/2017 10:08 AM    HDL 57 06/14/2017 10:08 AM    TRIGLYCERIDE 154 (H) 06/14/2017 10:08 AM

## 2018-08-27 NOTE — PROGRESS NOTES
Subjective:   Chief Complaint/History of Present Illness:  Mikal Rushing is a 63 y.o. male established who presents today for Annual Medical exam, to review the following medical problems.      The primary encounter diagnosis was Annual physical exam. Diagnoses of Dyslipidemia, Elevated alkaline phosphatase level, Elevated fasting glucose, and Need for vaccination were also pertinent to this visit.    PMH, PSH, Social History, Medications, Allergies, FMH all reviewed as documented:    Dyslipidemia  Patient and I discussed recent labs (see below; mixed dyslipidemia, uncontrolled) and that ASCVD risk is increased based on most recent lipid panel, current blood pressure (non-hypertensive, without medication), diabetes status (non-diabetic), and smoking status (no-smoker).    Patient and I then discussed necessary dietary changes to make to address dyslipidemia.  Patient is NOT currently taking cholesterol lowering medication.  Patient verbalized understanding.    ROS is NEGATIVE for dizziness, generalized weakness/fatigue, vision/hearing changes, jaw pain/paresthesias, BUE pain/paresthesias/numbness/weakness, chest pain/pressure, palpitations, dyspnea, RUQ abdominal pain, oliguria/anuria, BLE edema.    Lab Results   Component Value Date/Time    CHOLSTRLTOT 200 (H) 06/14/2017 10:08 AM     (H) 06/14/2017 10:08 AM    HDL 57 06/14/2017 10:08 AM    TRIGLYCERIDE 154 (H) 06/14/2017 10:08 AM       Elevated alkaline phosphatase level  Chronic, unknown control, patient did not get labs from last time.  However, patient will get labs for review.    Elevated fasting glucose  Chronic, unknown control, patient did not get labs.      Patient Active Problem List    Diagnosis Date Noted   • Elevated alkaline phosphatase level 06/21/2017   • Elevated fasting glucose 06/21/2017   • Dyslipidemia 12/07/2016       Additional History:   Allergies:    Pcn [penicillins]     Medications:     No current Epic-ordered  "outpatient prescriptions on file.     No current [facility]-ordered facility-administered medications on file.         Past Medical History:     Past Medical History:   Diagnosis Date   • Hyperlipidemia         Past Surgical History:   History reviewed. No pertinent surgical history.     Social History:     Social History   Substance Use Topics   • Smoking status: Current Every Day Smoker     Packs/day: 0.50     Years: 20.00     Types: Cigarettes   • Smokeless tobacco: Current User     Types: Chew   • Alcohol use 0.6 oz/week     1 Glasses of wine per week        Family History:     Family Status   Relation Status   • Mo Alive   • Fa    • Sis Alive   • Bro Alive   • Bro Alive   • Bro Alive   • Sis Alive   • Bro Alive        Family History   Problem Relation Age of Onset   • Hypertension Mother    • Diabetes Father    • Diabetes Sister    • Diabetes Brother    • Diabetes Brother    • Diabetes Brother        ROS:     - NOTE: All other systems reviewed and are negative, except as in HPI.     Objective:   Physical Exam:    Vitals: Blood pressure 112/64, pulse 84, temperature 36.9 °C (98.4 °F), height 1.651 m (5' 5\"), weight 59.8 kg (131 lb 12.8 oz), SpO2 99 %.   BMI: Body mass index is 21.93 kg/m².   General/Constitutional: Vitals as above, Well nourished, well developed male in no acute distress   Head/Eyes: Head is grossly normal & atraumatic, bilateral conjunctivae clear and not injected, bilateral EOMI, bilateral PERRL   ENT: Bilateral external ears grossly normal in appearance, Hearing grossly intact, External nares normal in appearance and without discharge/bleeding   Respiratory: No respiratory distress, bilateral lungs are clear to ausculation in all lung fields (anterior/lateral/posterior), no wheezing/rhonchi/rales   Cardiovascular: Regular rate and rhythm without murmur/gallops/rubs, distal pulses are intact and equal bilaterally (radial, posterior tibial), no bilateral lower extremity edema   MSK: Gait " grossly normal & not antalgic   Integumentary: No apparent rashes   Psych: Judgment grossly appropriate, no apparent depression/anxiety      Health Maintenance:     - Up-to-date apart from vaccines, as addressed below    - 08/21/18 -- FIT NEGATIVE    Imaging/Labs:     - patient will get labs relatively soon.    Assessment and Plan:   1. Annual physical exam  Patient presumed to be in good health, however we will need to have labs to be able to assess metabolic health    2. Dyslipidemia  Chronic, unknown control, patient to get labs soon.    3. Elevated alkaline phosphatase level  Chronic, unknown control, patient to get labs soon.    4. Elevated fasting glucose  Chronic, unknown control, patient to get labs soon.    5. Need for vaccination  Uncontrolled, addressed as below.   - TDAP VACCINE =>8YO IM   - PneumoVax PPV23 =>1yo   - Zoster Vac Recomb Adjuvanted (SHINGRIX) 50 MCG Recon Susp; 0.5 mL by Intramuscular route Once for 1 dose.  Dispense: 0.5 mL; Refill: 1      RTC: Depends on labs, will inform patient at that time..    PLEASE NOTE: This dictation was created using voice recognition software. I have made every reasonable attempt to correct obvious errors, but I expect that there are errors of grammar and possibly content that I did not discover before finalizing the note.

## 2018-08-29 ENCOUNTER — HOSPITAL ENCOUNTER (OUTPATIENT)
Dept: LAB | Facility: MEDICAL CENTER | Age: 63
End: 2018-08-29
Attending: FAMILY MEDICINE
Payer: COMMERCIAL

## 2018-08-29 DIAGNOSIS — Z13.1 DIABETES MELLITUS SCREENING: ICD-10-CM

## 2018-08-29 DIAGNOSIS — Z13.220 ENCOUNTER FOR LIPID SCREENING FOR CARDIOVASCULAR DISEASE: ICD-10-CM

## 2018-08-29 DIAGNOSIS — R74.8 ELEVATED ALKALINE PHOSPHATASE LEVEL: ICD-10-CM

## 2018-08-29 DIAGNOSIS — E78.5 DYSLIPIDEMIA: ICD-10-CM

## 2018-08-29 DIAGNOSIS — Z13.6 ENCOUNTER FOR LIPID SCREENING FOR CARDIOVASCULAR DISEASE: ICD-10-CM

## 2018-08-29 DIAGNOSIS — R73.01 ELEVATED FASTING GLUCOSE: ICD-10-CM

## 2018-08-29 LAB
ALBUMIN SERPL BCP-MCNC: 4.1 G/DL (ref 3.2–4.9)
ALBUMIN/GLOB SERPL: 1.6 G/DL
ALP SERPL-CCNC: 124 U/L (ref 30–99)
ALT SERPL-CCNC: 16 U/L (ref 2–50)
ANION GAP SERPL CALC-SCNC: 8 MMOL/L (ref 0–11.9)
AST SERPL-CCNC: 18 U/L (ref 12–45)
BILIRUB SERPL-MCNC: 0.9 MG/DL (ref 0.1–1.5)
BUN SERPL-MCNC: 16 MG/DL (ref 8–22)
CALCIUM SERPL-MCNC: 9.3 MG/DL (ref 8.5–10.5)
CHLORIDE SERPL-SCNC: 106 MMOL/L (ref 96–112)
CHOLEST SERPL-MCNC: 222 MG/DL (ref 100–199)
CO2 SERPL-SCNC: 25 MMOL/L (ref 20–33)
CREAT SERPL-MCNC: 0.81 MG/DL (ref 0.5–1.4)
EST. AVERAGE GLUCOSE BLD GHB EST-MCNC: 128 MG/DL
GLOBULIN SER CALC-MCNC: 2.5 G/DL (ref 1.9–3.5)
GLUCOSE SERPL-MCNC: 94 MG/DL (ref 65–99)
HBA1C MFR BLD: 6.1 % (ref 0–5.6)
HDLC SERPL-MCNC: 58 MG/DL
LDLC SERPL CALC-MCNC: 127 MG/DL
POTASSIUM SERPL-SCNC: 3.8 MMOL/L (ref 3.6–5.5)
PROT SERPL-MCNC: 6.6 G/DL (ref 6–8.2)
SODIUM SERPL-SCNC: 139 MMOL/L (ref 135–145)
TRIGL SERPL-MCNC: 185 MG/DL (ref 0–149)

## 2018-08-29 PROCEDURE — 80061 LIPID PANEL: CPT

## 2018-08-29 PROCEDURE — 36415 COLL VENOUS BLD VENIPUNCTURE: CPT

## 2018-08-29 PROCEDURE — 83036 HEMOGLOBIN GLYCOSYLATED A1C: CPT

## 2018-08-29 PROCEDURE — 80053 COMPREHEN METABOLIC PANEL: CPT

## 2018-09-04 ENCOUNTER — TELEPHONE (OUTPATIENT)
Dept: MEDICAL GROUP | Facility: MEDICAL CENTER | Age: 63
End: 2018-09-04

## 2018-09-04 PROBLEM — R73.03 PREDIABETES: Status: ACTIVE | Noted: 2017-06-21

## 2018-09-04 NOTE — TELEPHONE ENCOUNTER
----- Message from Rasheed Cabrera M.D. sent at 9/4/2018  3:44 AM PDT -----  MA to call patient to relate the following:     - Mixed dyslipidemia, Prediabetes, continued liver or gallbladder congestion     - If patient would like to discuss them in further detail, we can discuss this at clinic visit that he can schedule for ~6months for labs recheck and follow-up on lifestyle changes (exercise, diet) and labs (he can request orders ~1month before the appointment).

## 2018-09-04 NOTE — TELEPHONE ENCOUNTER
Phone Number Called: [phone] (home)      Message: Left message for patient to call us back for results.    Left Message for patient to call back: yes

## 2018-09-19 ENCOUNTER — OFFICE VISIT (OUTPATIENT)
Dept: MEDICAL GROUP | Facility: MEDICAL CENTER | Age: 63
End: 2018-09-19
Payer: COMMERCIAL

## 2018-09-19 VITALS
SYSTOLIC BLOOD PRESSURE: 112 MMHG | DIASTOLIC BLOOD PRESSURE: 60 MMHG | HEIGHT: 65 IN | OXYGEN SATURATION: 96 % | WEIGHT: 131.2 LBS | BODY MASS INDEX: 21.86 KG/M2 | TEMPERATURE: 98.5 F | HEART RATE: 74 BPM

## 2018-09-19 DIAGNOSIS — E78.5 DYSLIPIDEMIA: ICD-10-CM

## 2018-09-19 DIAGNOSIS — R74.8 ELEVATED ALKALINE PHOSPHATASE LEVEL: ICD-10-CM

## 2018-09-19 DIAGNOSIS — R73.03 PREDIABETES: ICD-10-CM

## 2018-09-19 PROCEDURE — 99214 OFFICE O/P EST MOD 30 MIN: CPT | Performed by: FAMILY MEDICINE

## 2018-09-24 NOTE — PROGRESS NOTES
Subjective:   Chief Complaint/History of Present Illness:  Mikal Rushing is a 63 y.o. male established patient who presents today to discuss medical problems as listed below    Diagnoses of Elevated alkaline phosphatase level, Prediabetes, and Dyslipidemia were pertinent to this visit.    Elevated alkaline phosphatase level  Chronic, uncontrolled, as determined by recent labs.  I discussed with patient that with the alkaline phosphatase elevation, he may have either hepatic congestion, increased bone turnover, or cancer.  We discussed these possibilities in detail, and will proceed with recheck of LFTs as well as GGT determine if this is more likely to be bone or hepatic in origin, and I have provided him with further labs to determine next step in evaluation if these repeat labs are inconclusive.    Patient denies binge or regular&heavy alcohol drinking behaviors, denies IV drug use, denies recent sexual intercourse with new partners.    ROS is NEGATIVE for generalized weakness/fatigue, generalized pruritis, jaundice, RUQ pain.       Prediabetes  We discussed that patient is diagnosed with prediabetes, uncontrolled, given that the A1c is:   Lab Results   Component Value Date/Time    HBA1C 6.1 (H) 08/29/2018 08:34 AM        Patient is currently taking (no medication) for glycemic control.    We discussed that prediabetes/diabetes mellitus type 2 are medical conditions of lifestyle habits (less than optimal dietary choices, insufficient cardiovascular exercise), and that they can be controlled (in part or in whole) by these lifestyle changes.     Furthermore, we discussed that at present time it is better to pursue lifestyle changes rather than starting medications. Patient is in agreement.     Please see assessment/plan as below for further details.    ROS is NEGATIVE for blurred vision, polydipsia, polyuria, diaphoresis, palpitations, fatigue, irritability, flank pain, BLE paresthesias.  "    Dyslipidemia  Patient and I discussed recent labs (see below; mixed dyslipidemia, uncontrolled) and that ASCVD risk is increased based on most recent lipid panel, current blood pressure (non-hypertensive), diabetes status (prediabetic), and smoking status (non-smoker).    Patient and I then discussed necessary dietary changes to make to address dyslipidemia.  Patient is NOT currently taking cholesterol lowering medication.  Patient verbalized understanding.    ROS is NEGATIVE for dizziness, generalized weakness/fatigue, vision/hearing changes, jaw pain/paresthesias, BUE pain/paresthesias/numbness/weakness, chest pain/pressure, palpitations, dyspnea, RUQ abdominal pain, oliguria/anuria, BLE edema.    Lab Results   Component Value Date/Time    CHOLSTRLTOT 222 (H) 08/29/2018 08:34 AM     (H) 08/29/2018 08:34 AM    HDL 58 08/29/2018 08:34 AM    TRIGLYCERIDE 185 (H) 08/29/2018 08:34 AM       Patient Active Problem List    Diagnosis Date Noted   • Elevated alkaline phosphatase level 06/21/2017   • Prediabetes 06/21/2017   • Dyslipidemia 12/07/2016       Additional History:   Allergies:    Pcn [penicillins]     Current Medications:     No current outpatient prescriptions on file.     No current facility-administered medications for this visit.         Social History:     Social History   Substance Use Topics   • Smoking status: Current Every Day Smoker     Packs/day: 0.50     Years: 20.00     Types: Cigarettes   • Smokeless tobacco: Current User     Types: Chew   • Alcohol use 0.6 oz/week     1 Glasses of wine per week       ROS:     - NOTE: All other systems reviewed and are negative, except as in HPI.     Objective:   Physical Exam:    Vitals: Blood pressure 112/60, pulse 74, temperature 36.9 °C (98.5 °F), height 1.651 m (5' 5\"), weight 59.5 kg (131 lb 3.2 oz), SpO2 96 %.   BMI: Body mass index is 21.83 kg/m².   General/Constitutional: Vitals as above, Well nourished, well developed male in no acute " distress   Head/Eyes: Head is grossly normal & atraumatic, bilateral conjunctivae clear and not injected, bilateral EOMI, bilateral PERRL   ENT: Bilateral external ears grossly normal in appearance, Hearing grossly intact, External nares normal in appearance and without discharge/bleeding   Respiratory: No respiratory distress, bilateral lungs are clear to ausculation in all lung fields (anterior/lateral/posterior), no wheezing/rhonchi/rales   Cardiovascular: Regular rate and rhythm without murmur/gallops/rubs, distal pulses are intact and equal bilaterally (radial, posterior tibial), no bilateral lower extremity edema   MSK: Gait grossly normal & not antalgic   Integumentary: No apparent rashes   Psych: Judgment grossly appropriate, no apparent depression/anxiety    Health Maintenance:     -Not addressed at this visit.    Imaging/Labs:     - 08/29/18 -- Mixed dyslipidemia, Prediabetes, continued liver or gallbladder congestion    Assessment and Plan:   1. Elevated alkaline phosphatase level  New problem, uncontrolled, assessment/plan as in HPI above.   - CBC WITH DIFFERENTIAL; Future   - VITAMIN D,25 HYDROXY; Future   - PTH WITH IONIZED CALCIUM; Future   - TSH WITH REFLEX TO FT4; Future   - HEPATIC FUNCTION PANEL; Future   - GAMMA GT (GGT); Future   - ALKALINE PHOSPHATASE ISOENZYMES; Future    2. Prediabetes  New problem, uncontrolled, patient advised to pursue lifestyle changes.  Will recheck in the future.    3. Dyslipidemia  New problem, uncontrolled, patient advised to pursue lifestyle changes.  Will recheck in the future.        RTC: in 6months for Lifestyle Changes management, Labs follow-up (will order labs in the future).    PLEASE NOTE: This dictation was created using voice recognition software. I have made every reasonable attempt to correct obvious errors, but I expect that there are errors of grammar and possibly content that I did not discover before finalizing the note.

## 2018-09-24 NOTE — ASSESSMENT & PLAN NOTE
Patient and I discussed recent labs (see below; mixed dyslipidemia, uncontrolled) and that ASCVD risk is increased based on most recent lipid panel, current blood pressure (non-hypertensive), diabetes status (prediabetic), and smoking status (non-smoker).    Patient and I then discussed necessary dietary changes to make to address dyslipidemia.  Patient is NOT currently taking cholesterol lowering medication.  Patient verbalized understanding.    ROS is NEGATIVE for dizziness, generalized weakness/fatigue, vision/hearing changes, jaw pain/paresthesias, BUE pain/paresthesias/numbness/weakness, chest pain/pressure, palpitations, dyspnea, RUQ abdominal pain, oliguria/anuria, BLE edema.    Lab Results   Component Value Date/Time    CHOLSTRLTOT 222 (H) 08/29/2018 08:34 AM     (H) 08/29/2018 08:34 AM    HDL 58 08/29/2018 08:34 AM    TRIGLYCERIDE 185 (H) 08/29/2018 08:34 AM

## 2018-09-24 NOTE — ASSESSMENT & PLAN NOTE
Chronic, uncontrolled, as determined by recent labs.  I discussed with patient that with the alkaline phosphatase elevation, he may have either hepatic congestion, increased bone turnover, or cancer.  We discussed these possibilities in detail, and will proceed with recheck of LFTs as well as GGT determine if this is more likely to be bone or hepatic in origin, and I have provided him with further labs to determine next step in evaluation if these repeat labs are inconclusive.    Patient denies binge or regular&heavy alcohol drinking behaviors, denies IV drug use, denies recent sexual intercourse with new partners.    ROS is NEGATIVE for generalized weakness/fatigue, generalized pruritis, jaundice, RUQ pain.

## 2018-09-24 NOTE — ASSESSMENT & PLAN NOTE
We discussed that patient is diagnosed with prediabetes, uncontrolled, given that the A1c is:   Lab Results   Component Value Date/Time    HBA1C 6.1 (H) 08/29/2018 08:34 AM        Patient is currently taking (no medication) for glycemic control.    We discussed that prediabetes/diabetes mellitus type 2 are medical conditions of lifestyle habits (less than optimal dietary choices, insufficient cardiovascular exercise), and that they can be controlled (in part or in whole) by these lifestyle changes.     Furthermore, we discussed that at present time it is better to pursue lifestyle changes rather than starting medications. Patient is in agreement.     Please see assessment/plan as below for further details.    ROS is NEGATIVE for blurred vision, polydipsia, polyuria, diaphoresis, palpitations, fatigue, irritability, flank pain, BLE paresthesias.

## 2018-09-25 ENCOUNTER — HOSPITAL ENCOUNTER (OUTPATIENT)
Dept: LAB | Facility: MEDICAL CENTER | Age: 63
End: 2018-09-25
Attending: FAMILY MEDICINE
Payer: COMMERCIAL

## 2018-09-25 DIAGNOSIS — R74.8 ELEVATED ALKALINE PHOSPHATASE LEVEL: ICD-10-CM

## 2018-09-25 LAB
ALBUMIN SERPL BCP-MCNC: 4.1 G/DL (ref 3.2–4.9)
ALP SERPL-CCNC: 141 U/L (ref 30–99)
ALT SERPL-CCNC: 20 U/L (ref 2–50)
AST SERPL-CCNC: 18 U/L (ref 12–45)
BILIRUB CONJ SERPL-MCNC: 0.2 MG/DL (ref 0.1–0.5)
BILIRUB INDIRECT SERPL-MCNC: 0.8 MG/DL (ref 0–1)
BILIRUB SERPL-MCNC: 1 MG/DL (ref 0.1–1.5)
GGT SERPL-CCNC: 57 U/L (ref 7–51)
PROT SERPL-MCNC: 7.2 G/DL (ref 6–8.2)

## 2018-09-25 PROCEDURE — 36415 COLL VENOUS BLD VENIPUNCTURE: CPT

## 2018-09-25 PROCEDURE — 82977 ASSAY OF GGT: CPT

## 2018-09-25 PROCEDURE — 80076 HEPATIC FUNCTION PANEL: CPT

## 2018-09-27 DIAGNOSIS — R74.8 ELEVATED ALKALINE PHOSPHATASE LEVEL: ICD-10-CM

## 2018-09-27 DIAGNOSIS — E78.5 DYSLIPIDEMIA: ICD-10-CM

## 2018-09-27 DIAGNOSIS — R73.03 PREDIABETES: ICD-10-CM

## 2018-10-09 ENCOUNTER — HOSPITAL ENCOUNTER (OUTPATIENT)
Dept: LAB | Facility: MEDICAL CENTER | Age: 63
End: 2018-10-09
Attending: FAMILY MEDICINE
Payer: COMMERCIAL

## 2018-10-09 DIAGNOSIS — R74.8 ELEVATED ALKALINE PHOSPHATASE LEVEL: ICD-10-CM

## 2018-10-09 DIAGNOSIS — R73.03 PREDIABETES: ICD-10-CM

## 2018-10-09 DIAGNOSIS — E78.5 DYSLIPIDEMIA: ICD-10-CM

## 2018-10-09 LAB
ALBUMIN SERPL BCP-MCNC: 4.3 G/DL (ref 3.2–4.9)
ALP SERPL-CCNC: 118 U/L (ref 30–99)
ALT SERPL-CCNC: 18 U/L (ref 2–50)
AST SERPL-CCNC: 17 U/L (ref 12–45)
BILIRUB CONJ SERPL-MCNC: 0.2 MG/DL (ref 0.1–0.5)
BILIRUB INDIRECT SERPL-MCNC: 1 MG/DL (ref 0–1)
BILIRUB SERPL-MCNC: 1.2 MG/DL (ref 0.1–1.5)
CHOLEST SERPL-MCNC: 215 MG/DL (ref 100–199)
FASTING STATUS PATIENT QL REPORTED: NORMAL
HDLC SERPL-MCNC: 60 MG/DL
LDLC SERPL CALC-MCNC: 127 MG/DL
PROT SERPL-MCNC: 6.8 G/DL (ref 6–8.2)
TRIGL SERPL-MCNC: 138 MG/DL (ref 0–149)

## 2018-10-09 PROCEDURE — 36415 COLL VENOUS BLD VENIPUNCTURE: CPT

## 2018-10-09 PROCEDURE — 83036 HEMOGLOBIN GLYCOSYLATED A1C: CPT

## 2018-10-09 PROCEDURE — 80076 HEPATIC FUNCTION PANEL: CPT

## 2018-10-09 PROCEDURE — 80061 LIPID PANEL: CPT

## 2018-10-10 LAB
EST. AVERAGE GLUCOSE BLD GHB EST-MCNC: 128 MG/DL
HBA1C MFR BLD: 6.1 % (ref 0–5.6)

## 2019-03-20 ENCOUNTER — APPOINTMENT (OUTPATIENT)
Dept: MEDICAL GROUP | Facility: MEDICAL CENTER | Age: 64
End: 2019-03-20
Payer: COMMERCIAL

## 2019-04-24 ENCOUNTER — OFFICE VISIT (OUTPATIENT)
Dept: MEDICAL GROUP | Facility: MEDICAL CENTER | Age: 64
End: 2019-04-24
Payer: COMMERCIAL

## 2019-04-24 VITALS
WEIGHT: 130.4 LBS | HEART RATE: 78 BPM | SYSTOLIC BLOOD PRESSURE: 110 MMHG | TEMPERATURE: 97.8 F | HEIGHT: 65 IN | OXYGEN SATURATION: 98 % | DIASTOLIC BLOOD PRESSURE: 60 MMHG | BODY MASS INDEX: 21.73 KG/M2

## 2019-04-24 DIAGNOSIS — Z00.00 ANNUAL PHYSICAL EXAM: ICD-10-CM

## 2019-04-24 DIAGNOSIS — R07.89 CHEST PAIN, ATYPICAL: ICD-10-CM

## 2019-04-24 DIAGNOSIS — Z12.5 PROSTATE CANCER SCREENING: ICD-10-CM

## 2019-04-24 DIAGNOSIS — R00.2 HEART PALPITATIONS: ICD-10-CM

## 2019-04-24 DIAGNOSIS — R73.03 PREDIABETES: ICD-10-CM

## 2019-04-24 DIAGNOSIS — S46.819A STRAIN OF TRAPEZIUS MUSCLE, UNSPECIFIED LATERALITY, INITIAL ENCOUNTER: ICD-10-CM

## 2019-04-24 DIAGNOSIS — E78.5 DYSLIPIDEMIA: ICD-10-CM

## 2019-04-24 DIAGNOSIS — R74.8 ELEVATED ALKALINE PHOSPHATASE LEVEL: ICD-10-CM

## 2019-04-24 PROCEDURE — 99214 OFFICE O/P EST MOD 30 MIN: CPT | Performed by: FAMILY MEDICINE

## 2019-04-24 RX ORDER — DIPHENOXYLATE HYDROCHLORIDE AND ATROPINE SULFATE 2.5; .025 MG/1; MG/1
TABLET ORAL
COMMUNITY
End: 2021-11-24

## 2019-04-24 RX ORDER — OMEPRAZOLE 20 MG/1
20 CAPSULE, DELAYED RELEASE ORAL DAILY
COMMUNITY
End: 2021-11-24

## 2019-04-24 ASSESSMENT — PATIENT HEALTH QUESTIONNAIRE - PHQ9: CLINICAL INTERPRETATION OF PHQ2 SCORE: 0

## 2019-04-25 NOTE — ASSESSMENT & PLAN NOTE
We discussed that patient is diagnosed with prediabetes, uncontrolled, given that the A1c is:   Lab Results   Component Value Date/Time    HBA1C 6.1 (H) 10/09/2018 01:30 PM        Patient is currently taking (no medication) for glycemic control.    We discussed that prediabetes/diabetes mellitus type 2 are medical conditions of lifestyle habits (less than optimal dietary choices, insufficient cardiovascular exercise), and that they can be controlled (in part or in whole) by these lifestyle changes.     Furthermore, we discussed that at present time it is better to pursue lifestyle changes rather than starting medications. Patient is in agreement.     ROS is NEGATIVE for blurred vision, polydipsia, polyuria, diaphoresis, palpitations, fatigue, irritability, flank pain, BLE paresthesias.

## 2019-04-25 NOTE — PROGRESS NOTES
Subjective:   Chief Complaint/History of Present Illness:  Mikal Rushing is a 63 y.o. male established patient who presents today to discuss medical problems as listed below    Diagnoses of Chest pain, atypical, Heart palpitations, Dyslipidemia, Prediabetes, Strain of trapezius muscle, unspecified laterality, initial encounter, Elevated alkaline phosphatase level, Prostate cancer screening, and Annual physical exam were pertinent to this visit.    Chest pain, atypical  New problem, uncontrolled, patient with intermittent palpitations and chest pain/discomfort.  This does not occur on a regular basis, but seems to be triggered by stress, improved with rest.      Patient does have prediabetes + pure hypercholesterolemia.    ROS is NEGATIVE for dizziness, generalized weakness/fatigue, cold sweats,  vision/hearing changes, jaw pain/paresthesias, BUE pain/paresthesias/numbness/weakness, dyspnea, nausea, RUQ abdominal pain, oliguria/anuria, BLE edema.    Heart palpitations  New problem, uncontrolled, please see notes from same date of service 04/24/19 re: chest pain, atypical.    Dyslipidemia  Patient and I discussed recent labs (see below; pure hypercholesterolemia, uncontrolled) and that ASCVD risk is increased based on most recent lipid panel, current blood pressure (non-hypertensive), diabetes status (prediabetic), and smoking status (non-smoker).    Patient and I then discussed necessary dietary changes to make to address dyslipidemia.  Patient is NOT currently taking cholesterol lowering medication.  Patient verbalized understanding.    Lab Results   Component Value Date/Time    CHOLSTRLTOT 215 (H) 10/09/2018 01:30 PM     (H) 10/09/2018 01:30 PM    HDL 60 10/09/2018 01:30 PM    TRIGLYCERIDE 138 10/09/2018 01:30 PM       Prediabetes  We discussed that patient is diagnosed with prediabetes, uncontrolled, given that the A1c is:   Lab Results   Component Value Date/Time    HBA1C 6.1 (H) 10/09/2018  01:30 PM        Patient is currently taking (no medication) for glycemic control.    We discussed that prediabetes/diabetes mellitus type 2 are medical conditions of lifestyle habits (less than optimal dietary choices, insufficient cardiovascular exercise), and that they can be controlled (in part or in whole) by these lifestyle changes.     Furthermore, we discussed that at present time it is better to pursue lifestyle changes rather than starting medications. Patient is in agreement.     ROS is NEGATIVE for blurred vision, polydipsia, polyuria, diaphoresis, palpitations, fatigue, irritability, flank pain, BLE paresthesias.    Trapezius muscle strain  New problem, uncontrolled, patient with muscular tenderness and headache originating the posterior aspect of bilateral neck/shoulders.  Patient works as a , admits to holding BUE in raised position in tension for a significant part of the day.     No accidents to neck, patient has not stretched, patient is not taking medication for this condition.    Elevated alkaline phosphatase level  Chronic, unknown control, will evaluate with labs.      Patient Active Problem List    Diagnosis Date Noted   • Trapezius muscle strain 04/24/2019   • Chest pain, atypical 04/24/2019   • Heart palpitations 04/24/2019   • Elevated alkaline phosphatase level 06/21/2017   • Prediabetes 06/21/2017   • Dyslipidemia 12/07/2016       Additional History:   Allergies:    Pcn [penicillins]     Current Medications:     Current Outpatient Prescriptions   Medication Sig Dispense Refill   • Multiple Vitamin (MULTI-VITAMINS) Tab Take  by mouth.     • omeprazole (PRILOSEC) 20 MG delayed-release capsule Take 20 mg by mouth every day.       No current facility-administered medications for this visit.         Social History:     Social History   Substance Use Topics   • Smoking status: Current Every Day Smoker     Packs/day: 0.50     Years: 20.00     Types: Cigarettes   • Smokeless tobacco: Current  "User     Types: Chew   • Alcohol use 0.6 oz/week     1 Glasses of wine per week       ROS:     - NOTE: All other systems reviewed and are negative, except as in HPI.     Objective:   Physical Exam:    Vitals: /60   Pulse 78   Temp 36.6 °C (97.8 °F)   Ht 1.651 m (5' 5\")   Wt 59.1 kg (130 lb 6.4 oz)   SpO2 98%    BMI: Body mass index is 21.7 kg/m².   General/Constitutional: Vitals as above, Well nourished, well developed male in no acute distress   Head/Eyes: Head is grossly normal & atraumatic, bilateral conjunctivae clear and not injected, bilateral EOMI, bilateral PERRL   ENT: Bilateral external ears grossly normal in appearance, Hearing grossly intact, External nares normal in appearance and without discharge/bleeding   Respiratory: No respiratory distress, bilateral lungs are clear to ausculation in all lung fields (anterior/lateral/posterior), no wheezing/rhonchi/rales   Cardiovascular: Regular rate and rhythm without murmur/gallops/rubs, distal pulses are intact and equal bilaterally (radial, posterior tibial), no bilateral lower extremity edema, no reproducible chest wall pain   MSK: Bilateral trapezius tenderness to palpation with increased in muscular spasms/tension, Gait grossly normal & not antalgic   Integumentary: No apparent rashes   Psych: Judgment grossly appropriate, no apparent depression/anxiety    Health Maintenance:     - Not reviewed at this visit    Imaging/Labs:     - 10/09/18 -- Prediabetes, elevated LDL (bad) and total cholesterol levels, normal liver & kidney function    - 09/27/18 -- Alk phos d/t liver (GGT positive)    Assessment and Plan:   1. Chest pain, atypical  2. Heart palpitations  New problem, uncontrolled, evaluate with orders as below.  Patient given ER precautions.   - Cardiac Stress Test Treadmill Only   - Comp Metabolic Panel; Future   - Lipid Profile; Future   - HEMOGLOBIN A1C; Future    3. Dyslipidemia  Chronic, uncontrolled, patient advised to pursue lifestyle " changes, particularly cardiovascular exercise and increasing proportion of plant-based nutrition.   - Lipid Profile; Future    4. Prediabetes  Chronic, uncontrolled, patient advised to pursue lifestyle changes, particularly cardiovascular exercise and increasing proportion of plant-based nutrition.   - HEMOGLOBIN A1C; Future    5. Strain of trapezius muscle, unspecified laterality, initial encounter  New problem, uncontrolled, Patient given conservative care instructions (NSAIDs, stretching, warm & cold compresses, OTC oral and topical analgesics) as well as instructions re: stretches sourced from Sports Medicine Advisor regarding neck strain.    6. Elevated alkaline phosphatase level  Chronic, unknown control, evaluate with labs..   - Comp Metabolic Panel; Future    7. Prostate cancer screening   - PROSTATE SPECIFIC AG SCREENING; Future    8. Annual physical exam  Labs ordered for annual physical exam   - Comp Metabolic Panel; Future   - Lipid Profile; Future   - PROSTATE SPECIFIC AG SCREENING; Future   - HEMOGLOBIN A1C; Future        RTC: in 6months for annual medical exam.    PLEASE NOTE: This dictation was created using voice recognition software. I have made every reasonable attempt to correct obvious errors, but I expect that there are errors of grammar and possibly content that I did not discover before finalizing the note.

## 2019-04-25 NOTE — ASSESSMENT & PLAN NOTE
New problem, uncontrolled, please see notes from same date of service 04/24/19 re: chest pain, atypical.

## 2019-04-25 NOTE — ASSESSMENT & PLAN NOTE
New problem, uncontrolled, patient with muscular tenderness and headache originating the posterior aspect of bilateral neck/shoulders.  Patient works as a , admits to holding BUE in raised position in tension for a significant part of the day.     No accidents to neck, patient has not stretched, patient is not taking medication for this condition.

## 2019-04-25 NOTE — ASSESSMENT & PLAN NOTE
New problem, uncontrolled, patient with intermittent palpitations and chest pain/discomfort.  This does not occur on a regular basis, but seems to be triggered by stress, improved with rest.      Patient does have prediabetes + pure hypercholesterolemia.    ROS is NEGATIVE for dizziness, generalized weakness/fatigue, cold sweats,  vision/hearing changes, jaw pain/paresthesias, BUE pain/paresthesias/numbness/weakness, dyspnea, nausea, RUQ abdominal pain, oliguria/anuria, BLE edema.

## 2019-04-25 NOTE — ASSESSMENT & PLAN NOTE
Patient and I discussed recent labs (see below; pure hypercholesterolemia, uncontrolled) and that ASCVD risk is increased based on most recent lipid panel, current blood pressure (non-hypertensive), diabetes status (prediabetic), and smoking status (non-smoker).    Patient and I then discussed necessary dietary changes to make to address dyslipidemia.  Patient is NOT currently taking cholesterol lowering medication.  Patient verbalized understanding.    Lab Results   Component Value Date/Time    CHOLSTRLTOT 215 (H) 10/09/2018 01:30 PM     (H) 10/09/2018 01:30 PM    HDL 60 10/09/2018 01:30 PM    TRIGLYCERIDE 138 10/09/2018 01:30 PM

## 2019-05-22 ENCOUNTER — OFFICE VISIT (OUTPATIENT)
Dept: MEDICAL GROUP | Facility: MEDICAL CENTER | Age: 64
End: 2019-05-22
Payer: COMMERCIAL

## 2019-05-22 VITALS
SYSTOLIC BLOOD PRESSURE: 118 MMHG | TEMPERATURE: 98.4 F | HEART RATE: 77 BPM | OXYGEN SATURATION: 100 % | DIASTOLIC BLOOD PRESSURE: 62 MMHG | HEIGHT: 65 IN | BODY MASS INDEX: 22.16 KG/M2 | WEIGHT: 133 LBS

## 2019-05-22 DIAGNOSIS — L23.9 ALLERGIC DERMATITIS: ICD-10-CM

## 2019-05-22 PROCEDURE — 99214 OFFICE O/P EST MOD 30 MIN: CPT | Performed by: FAMILY MEDICINE

## 2019-05-22 RX ORDER — PREDNISONE 10 MG/1
10 TABLET ORAL 2 TIMES DAILY
Qty: 14 TAB | Refills: 0 | Status: SHIPPED | OUTPATIENT
Start: 2019-05-22 | End: 2019-05-29

## 2019-05-22 RX ORDER — DIPHENHYDRAMINE HCL 25 MG
25 CAPSULE ORAL EVERY 6 HOURS PRN
Qty: 30 CAP | COMMUNITY
Start: 2019-05-22 | End: 2019-12-10

## 2019-05-22 NOTE — PROGRESS NOTES
Subjective:   Chief Complaint/History of Present Illness:  Mikal Rushing is a 63 y.o. male established patient who presents today to discuss medical problems as listed below    The encounter diagnosis was Allergic dermatitis.    Allergic dermatitis  New problem, uncontrolled, patient states that over the last 2 weeks he has been having an erythematous raised plaque type rash over his chest, and back.  This has been mildly pruritic, and patient was concerned that he may be having scabies.  However, there is no vesicular lesions, and patient's brother (they live together) does not have a similar condition.    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  Therefore, this is a potential cause of new allergenic exposure.    Otherwise, patient and brother have not changed clothing, soap, or any other potential contact allergens in their home.    Skin lesions do not have any purulent or bloody drainage, patient is not having any systemic symptoms of infection such as Tristan weakness/fatigue, myalgias, arthralgias, chest congestion, throat closing, wheezing.      Patient Active Problem List    Diagnosis Date Noted   • Allergic dermatitis 05/22/2019   • Trapezius muscle strain 04/24/2019   • Chest pain, atypical 04/24/2019   • Heart palpitations 04/24/2019   • Elevated alkaline phosphatase level 06/21/2017   • Prediabetes 06/21/2017   • Dyslipidemia 12/07/2016       Additional History:   Allergies:    Pcn [penicillins]     Current Medications:     Current Outpatient Prescriptions   Medication Sig Dispense Refill   • diphenhydrAMINE (BENADRYL) 25 MG capsule Take 1 Cap by mouth every 6 hours as needed for Itching or Rash. 30 Cap    • predniSONE (DELTASONE) 10 MG Tab Take 1 Tab by mouth 2 times a day for 7 days. 14 Tab 0   • Multiple Vitamin (MULTI-VITAMINS) Tab Take  by mouth.     •  "omeprazole (PRILOSEC) 20 MG delayed-release capsule Take 20 mg by mouth every day.       No current facility-administered medications for this visit.         Social History:     Social History   Substance Use Topics   • Smoking status: Current Every Day Smoker     Packs/day: 0.50     Years: 20.00     Types: Cigarettes   • Smokeless tobacco: Current User     Types: Chew   • Alcohol use 0.6 oz/week     1 Glasses of wine per week       ROS:     - NOTE: All other systems reviewed and are negative, except as in HPI.     Objective:   Physical Exam:    Vitals: /62 (BP Location: Left arm, Patient Position: Sitting, BP Cuff Size: Adult)   Pulse 77   Temp 36.9 °C (98.4 °F) (Temporal)   Ht 1.651 m (5' 5\")   Wt 60.3 kg (133 lb)   SpO2 100%    BMI: Body mass index is 22.13 kg/m².   General/Constitutional: Vitals as above, Well nourished, well developed male in no acute distress   Head/Eyes: Head is grossly normal & atraumatic, bilateral conjunctivae clear and not injected, bilateral EOMI, bilateral PERRL   ENT: Bilateral external ears grossly normal in appearance, Hearing grossly intact, External nares normal in appearance and without discharge/bleeding   Respiratory: No respiratory distress, bilateral lungs are clear to ausculation in all lung fields (anterior/lateral/posterior), no wheezing/rhonchi/rales   Cardiovascular: Regular rate and rhythm without murmur/gallops/rubs, distal pulses are intact and equal bilaterally (radial, posterior tibial), no bilateral lower extremity edema   MSK: Gait grossly normal & not antalgic   Integumentary: Patient with mild raised erythematous wheals along his chest, more so upper chest, with patches of wheals no more than half a centimeter in height, otherwise no apparent rashes   Psych: Judgment grossly appropriate, no apparent depression/anxiety    Health Maintenance:     - Not reviewed at this visit    Imaging/Labs:     - Not reviewed at this visit    Assessment and Plan:   1. " Allergic dermatitis  New problem, uncontrolled, patient advised to use diphenhydramine to control the symptoms, also advised to use a sleeve this undershirt to see if this will decrease recurrence of rash.  Prednisone prescribed in case diphenhydramine is insufficient.  As this is not intensely pruritic, patient does not need to use topical steroid creams.   - diphenhydrAMINE (BENADRYL) 25 MG capsule; Take 1 Cap by mouth every 6 hours as needed for Itching or Rash.  Dispense: 30 Cap   - predniSONE (DELTASONE) 10 MG Tab; Take 1 Tab by mouth 2 times a day for 7 days.  Dispense: 14 Tab; Refill: 0        RTC: As needed for worsening of symptoms, otherwise as previously scheduled for October 2019.    PLEASE NOTE: This dictation was created using voice recognition software. I have made every reasonable attempt to correct obvious errors, but I expect that there are errors of grammar and possibly content that I did not discover before finalizing the note.

## 2019-05-22 NOTE — ASSESSMENT & PLAN NOTE
New problem, uncontrolled, patient states that over the last 2 weeks he has been having an erythematous raised plaque type rash over his chest, and back.  This has been mildly pruritic, and patient was concerned that he may be having scabies.  However, there is no vesicular lesions, and patient's brother (they live together) does not have a similar condition.    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  Therefore, this is a potential cause of new allergenic exposure.    Otherwise, patient and brother have not changed clothing, soap, or any other potential contact allergens in their home.    Skin lesions do not have any purulent or bloody drainage, patient is not having any systemic symptoms of infection such as Tristan weakness/fatigue, myalgias, arthralgias, chest congestion, throat closing, wheezing.

## 2019-12-10 ENCOUNTER — OFFICE VISIT (OUTPATIENT)
Dept: MEDICAL GROUP | Facility: MEDICAL CENTER | Age: 64
End: 2019-12-10
Payer: COMMERCIAL

## 2019-12-10 VITALS
RESPIRATION RATE: 16 BRPM | WEIGHT: 127 LBS | HEART RATE: 86 BPM | OXYGEN SATURATION: 98 % | SYSTOLIC BLOOD PRESSURE: 124 MMHG | DIASTOLIC BLOOD PRESSURE: 64 MMHG | BODY MASS INDEX: 21.16 KG/M2 | HEIGHT: 65 IN | TEMPERATURE: 97.6 F

## 2019-12-10 DIAGNOSIS — Z76.89 ENCOUNTER TO ESTABLISH CARE: ICD-10-CM

## 2019-12-10 DIAGNOSIS — R74.8 ELEVATED ALKALINE PHOSPHATASE LEVEL: ICD-10-CM

## 2019-12-10 DIAGNOSIS — Z12.12 SCREENING FOR COLORECTAL CANCER: ICD-10-CM

## 2019-12-10 DIAGNOSIS — Z11.59 ENCOUNTER FOR HEPATITIS C SCREENING TEST FOR LOW RISK PATIENT: ICD-10-CM

## 2019-12-10 DIAGNOSIS — M25.562 CHRONIC PAIN OF BOTH KNEES: ICD-10-CM

## 2019-12-10 DIAGNOSIS — Z12.5 SCREENING PSA (PROSTATE SPECIFIC ANTIGEN): ICD-10-CM

## 2019-12-10 DIAGNOSIS — M25.561 CHRONIC PAIN OF BOTH KNEES: ICD-10-CM

## 2019-12-10 DIAGNOSIS — G89.29 CHRONIC PAIN OF BOTH KNEES: ICD-10-CM

## 2019-12-10 DIAGNOSIS — R73.03 PREDIABETES: ICD-10-CM

## 2019-12-10 DIAGNOSIS — R10.13 DYSPEPSIA: ICD-10-CM

## 2019-12-10 DIAGNOSIS — Z12.11 SCREENING FOR COLORECTAL CANCER: ICD-10-CM

## 2019-12-10 DIAGNOSIS — E78.5 DYSLIPIDEMIA: ICD-10-CM

## 2019-12-10 PROBLEM — S46.819A TRAPEZIUS MUSCLE STRAIN: Status: RESOLVED | Noted: 2019-04-24 | Resolved: 2019-12-10

## 2019-12-10 PROBLEM — R07.89 CHEST PAIN, ATYPICAL: Status: RESOLVED | Noted: 2019-04-24 | Resolved: 2019-12-10

## 2019-12-10 PROCEDURE — 99214 OFFICE O/P EST MOD 30 MIN: CPT | Performed by: PHYSICIAN ASSISTANT

## 2019-12-10 RX ORDER — NAPROXEN SODIUM 220 MG
220 TABLET ORAL 2 TIMES DAILY WITH MEALS
COMMUNITY
End: 2021-11-24

## 2019-12-10 NOTE — LETTER
December 10, 2019         Patient: Mikal Rushing   YOB: 1955   Date of Visit: 12/10/2019           To Whom it May Concern:    Mikal Rushing was seen in my clinic on 12/10/2019. He may return to work on 12/13/19.    If you have any questions or concerns, please don't hesitate to call.        Sincerely,           Terrence Hill P.A.-C.  Electronically Signed

## 2019-12-11 NOTE — PROGRESS NOTES
Subjective:   Mikal Rushing is a 64 y.o. male here today for establish care, chronic bilateral knee pain, prediabetes, elevated alk phos, dyslipidemia and dyspepsia.  Also preventive health care.    Chronic pain of both knees  This is a 64-year-old male accompanied by his oldest brother, Manny.  Here today to establish care.  Complains of a chronic history of bilateral knee pain.  Has been seen in the past and had a x-ray in 2013 that showed an effusion of 1 of his knees.  Has had continued knee pain.  No significant trauma.  He works at a [employer].  Requesting a letter to return to work on Friday.  The job at the [employer] exacerbates his knee pain.  Complains of intermittent buckling.  Some swelling.  He is currently wearing some knee braces that are helping with his pain.  Takes naproxen as needed.  No recent surgeries on his knees.    Prediabetes  Prior history of prediabetes with A1c of 6.1.  No recent follow-up.    Elevated alkaline phosphatase level  Alkaline phosphatase value has been elevated in the past.  No recent CMP.    Dyslipidemia  Chronic condition.  Does not take medication.  No recent cholesterol profile.    Dyspepsia  Has intermittent dyspepsia.  Will take omeprazole as needed.  Symptoms usually triggered by foods.  Medication is effective.       Current medicines (including changes today)  Current Outpatient Medications   Medication Sig Dispense Refill   • naproxen (ANAPROX) 220 MG tablet Take 220 mg by mouth 2 times a day, with meals.     • Multiple Vitamin (MULTI-VITAMINS) Tab Take  by mouth.     • omeprazole (PRILOSEC) 20 MG delayed-release capsule Take 20 mg by mouth every day.       No current facility-administered medications for this visit.      He  has a past medical history of Hyperlipidemia. He also has no past medical history of Diabetes or Hypertension.    Social History and Family History were reviewed and updated.    ROS   No chest pain, no shortness of breath, no  "abdominal pain and all other systems were reviewed and are negative.       Objective:     /64 (BP Location: Right arm, Patient Position: Sitting, BP Cuff Size: Adult)   Pulse 86   Temp 36.4 °C (97.6 °F) (Temporal)   Resp 16   Ht 1.651 m (5' 5\")   Wt 57.6 kg (127 lb)   SpO2 98%  Body mass index is 21.13 kg/m².   Physical Exam:  Constitutional: Alert, no distress.  Skin: Warm, dry, good turgor, no rashes in visible areas.  Eye: Equal, round and reactive, conjunctiva clear, lids normal.  ENMT: Lips without lesions, good dentition, oropharynx clear.  Neck: Trachea midline, no masses.   Lymph: No cervical or supraclavicular lymphadenopathy  Respiratory: Unlabored respiratory effort, lungs appear clear, no wheezes.  Cardiovascular: Regular rate and rhythm.  Muscular skeletal: Bilateral knees appear symmetrical.  Psych: Alert and oriented x3, normal affect and mood.        Assessment and Plan:   The following treatment plan was discussed    1. Chronic pain of both knees  New condition noted in chart but chronic.  Refer to orthopedic for evaluation and treatment.  Advised x-rays would be performed by them.  Continue naproxen as directed.  - REFERRAL TO ORTHOPEDICS    2. Prediabetes  Chronic condition.  Status unknown.  A1c ordered.  - HEMOGLOBIN A1C; Future    3. Dyslipidemia  Chronic condition.  Status unknown.  Lipid panel ordered.  - Lipid Profile; Future    4. Dyspepsia  Chronic condition.  Stable.  Continue omeprazole as needed.    5. Elevated alkaline phosphatase level  Chronic condition.  Unknown status.  Ordered CMP.  - Comp Metabolic Panel; Future    6. Screening PSA (prostate specific antigen)  PSA ordered.  Asymptomatic.  - PROSTATE SPECIFIC AG SCREENING; Future    7. Screening for colorectal cancer  Discussed Cologuard.  Contact insurance before sending back.  - COLOGUARD (FIT DNA)    8. Encounter to establish care    9. Encounter for hepatitis C screening test for low risk patient  Hep C viral " antibody ordered.  Low risk.  - HEP C VIRUS ANTIBODY; Future      Followup: Return if symptoms worsen or fail to improve, for We will have him follow-up after labs.    Please note that this dictation was created using voice recognition software. I have made every reasonable attempt to correct obvious errors, but I expect that there are errors of grammar and possibly content that I did not discover before finalizing the note.

## 2020-01-08 ENCOUNTER — HOSPITAL ENCOUNTER (OUTPATIENT)
Dept: LAB | Facility: MEDICAL CENTER | Age: 65
End: 2020-01-08
Attending: PHYSICIAN ASSISTANT
Payer: COMMERCIAL

## 2020-01-08 DIAGNOSIS — R74.8 ELEVATED ALKALINE PHOSPHATASE LEVEL: ICD-10-CM

## 2020-01-08 DIAGNOSIS — Z12.5 SCREENING PSA (PROSTATE SPECIFIC ANTIGEN): ICD-10-CM

## 2020-01-08 DIAGNOSIS — E78.5 DYSLIPIDEMIA: ICD-10-CM

## 2020-01-08 DIAGNOSIS — R73.03 PREDIABETES: ICD-10-CM

## 2020-01-08 DIAGNOSIS — Z11.59 ENCOUNTER FOR HEPATITIS C SCREENING TEST FOR LOW RISK PATIENT: ICD-10-CM

## 2020-01-08 LAB
ALBUMIN SERPL BCP-MCNC: 3.9 G/DL (ref 3.2–4.9)
ALBUMIN/GLOB SERPL: 1.7 G/DL
ALP SERPL-CCNC: 139 U/L (ref 30–99)
ALT SERPL-CCNC: 13 U/L (ref 2–50)
ANION GAP SERPL CALC-SCNC: 7 MMOL/L (ref 0–11.9)
AST SERPL-CCNC: 13 U/L (ref 12–45)
BILIRUB SERPL-MCNC: 0.5 MG/DL (ref 0.1–1.5)
BUN SERPL-MCNC: 13 MG/DL (ref 8–22)
CALCIUM SERPL-MCNC: 9.1 MG/DL (ref 8.5–10.5)
CHLORIDE SERPL-SCNC: 106 MMOL/L (ref 96–112)
CHOLEST SERPL-MCNC: 182 MG/DL (ref 100–199)
CO2 SERPL-SCNC: 29 MMOL/L (ref 20–33)
CREAT SERPL-MCNC: 0.85 MG/DL (ref 0.5–1.4)
EST. AVERAGE GLUCOSE BLD GHB EST-MCNC: 123 MG/DL
GLOBULIN SER CALC-MCNC: 2.3 G/DL (ref 1.9–3.5)
GLUCOSE SERPL-MCNC: 92 MG/DL (ref 65–99)
HBA1C MFR BLD: 5.9 % (ref 0–5.6)
HDLC SERPL-MCNC: 64 MG/DL
LDLC SERPL CALC-MCNC: 87 MG/DL
POTASSIUM SERPL-SCNC: 4.4 MMOL/L (ref 3.6–5.5)
PROT SERPL-MCNC: 6.2 G/DL (ref 6–8.2)
SODIUM SERPL-SCNC: 142 MMOL/L (ref 135–145)
TRIGL SERPL-MCNC: 153 MG/DL (ref 0–149)

## 2020-01-08 PROCEDURE — 36415 COLL VENOUS BLD VENIPUNCTURE: CPT

## 2020-01-08 PROCEDURE — 80053 COMPREHEN METABOLIC PANEL: CPT

## 2020-01-08 PROCEDURE — 86803 HEPATITIS C AB TEST: CPT

## 2020-01-08 PROCEDURE — 80061 LIPID PANEL: CPT

## 2020-01-08 PROCEDURE — 84153 ASSAY OF PSA TOTAL: CPT

## 2020-01-08 PROCEDURE — 83036 HEMOGLOBIN GLYCOSYLATED A1C: CPT

## 2020-01-09 LAB
HCV AB SER QL: NEGATIVE
PSA SERPL-MCNC: 2.55 NG/ML (ref 0–4)

## 2020-01-16 ENCOUNTER — TELEPHONE (OUTPATIENT)
Dept: MEDICAL GROUP | Facility: MEDICAL CENTER | Age: 65
End: 2020-01-16

## 2020-01-17 NOTE — TELEPHONE ENCOUNTER
Phone Number Called: [phone] (home)       Call outcome: left message for patient to call back regarding message below    Message: Labs are good.  Maybe make an appointment to follow-up to discuss.  Or Terrence can see him in a year to repeat labs. [place] guard came back negative. cherelle pet in 3 years.    Mikal Jackson, Med Ass't

## 2020-05-29 ENCOUNTER — HOSPITAL ENCOUNTER (OUTPATIENT)
Facility: MEDICAL CENTER | Age: 65
End: 2020-05-29
Payer: COMMERCIAL

## 2020-05-29 PROCEDURE — U0003 INFECTIOUS AGENT DETECTION BY NUCLEIC ACID (DNA OR RNA); SEVERE ACUTE RESPIRATORY SYNDROME CORONAVIRUS 2 (SARS-COV-2) (CORONAVIRUS DISEASE [COVID-19]), AMPLIFIED PROBE TECHNIQUE, MAKING USE OF HIGH THROUGHPUT TECHNOLOGIES AS DESCRIBED BY CMS-2020-01-R: HCPCS

## 2020-05-31 LAB
SARS-COV-2 RNA SPEC QL NAA+PROBE: NOT DETECTED
SPECIMEN SOURCE: NORMAL

## 2021-03-03 DIAGNOSIS — Z23 NEED FOR VACCINATION: ICD-10-CM

## 2021-04-29 NOTE — ASSESSMENT & PLAN NOTE
Alkaline phosphatase value has been elevated in the past.  No recent CMP.  
Chronic condition.  Does not take medication.  No recent cholesterol profile.  
Has intermittent dyspepsia.  Will take omeprazole as needed.  Symptoms usually triggered by foods.  Medication is effective.  
Prior history of prediabetes with A1c of 6.1.  No recent follow-up.  
This is a 64-year-old male accompanied by his oldest brother, Manny.  Here today to establish care.  Complains of a chronic history of bilateral knee pain.  Has been seen in the past and had a x-ray in 2013 that showed an effusion of 1 of his knees.  Has had continued knee pain.  No significant trauma.  He works at a [employer].  Requesting a letter to return to work on Friday.  The job at the [employer] exacerbates his knee pain.  Complains of intermittent buckling.  Some swelling.  He is currently wearing some knee braces that are helping with his pain.  Takes naproxen as needed.  No recent surgeries on his knees.  
[FreeTextEntry1] : 34 y/o M pt, referred by Dr. Erik Royal, presents today for weight evaluation with BMI of 52.3.\par Other PMHx: Crohn's disease (dx 10-12 yrs ago) s/p Sigmoid resection, Small bowel resection, Repair of bladder fistula and Ileostomy. \par FHx: No FHx of endocrine disorders, thyroid CA, HTN, overweight and obesity. Pt has 8 siblings. \par SHx: Sedentary. No scheduled meals; eats multiple snacks during the day. Work 6-10 hours 5-6 days a week. \par NKDA\par \par 04/28/2021\par - Review of past medical records: BMI today of 52.3 and 45.01 on 9/2020 with weight gain of 27 lbs. No recent info on LDL-c. No Hx of endocrine/ metabolic disorders. Pt is on Stelara (immunosuppressant drug).\par - Review of Dr. Booker's note on 12/22/20:  Pt with fistulizing Crohn's disease s/p Sigmoid resection, small bowel resection and repair of bladder fistula with ileostomy on 6/9/20. \par - Review of GI Dr. Royal's note on 4/14/21: Pt with Hx of psoriasis, CD with multiple fistulas. \par \par Pt presents today with /76 and BMI 52.3 accompanied by his spouse. \par Pt was up to 260 lbs during his teenage years and went down to 180 lbs with diet modification. Since then, he has had weight fluctuations with upward trend. He reportedly lost lot of weight when he was dx with Crohn's disease 10-12 years ago. He has never participated in a weight loss program before or been on weight loss medication. He has never seen RD or exercise . Pt is not on antidepressants. \par He works 6-10 hours 5-6 days a week. He does not have scheduled meals and notes eating multiple snacks during the day "for no reason". \par \par Current Medications: Stelara

## 2021-08-10 ENCOUNTER — HOSPITAL ENCOUNTER (OUTPATIENT)
Facility: MEDICAL CENTER | Age: 66
End: 2021-08-10
Attending: NURSE PRACTITIONER
Payer: COMMERCIAL

## 2021-08-10 ENCOUNTER — OFFICE VISIT (OUTPATIENT)
Dept: URGENT CARE | Facility: PHYSICIAN GROUP | Age: 66
End: 2021-08-10
Payer: COMMERCIAL

## 2021-08-10 VITALS
HEART RATE: 80 BPM | WEIGHT: 140 LBS | SYSTOLIC BLOOD PRESSURE: 138 MMHG | DIASTOLIC BLOOD PRESSURE: 68 MMHG | HEIGHT: 65 IN | OXYGEN SATURATION: 99 % | RESPIRATION RATE: 15 BRPM | BODY MASS INDEX: 23.32 KG/M2 | TEMPERATURE: 98.1 F

## 2021-08-10 DIAGNOSIS — Z20.822 SUSPECTED COVID-19 VIRUS INFECTION: ICD-10-CM

## 2021-08-10 PROCEDURE — U0005 INFEC AGEN DETEC AMPLI PROBE: HCPCS

## 2021-08-10 PROCEDURE — U0003 INFECTIOUS AGENT DETECTION BY NUCLEIC ACID (DNA OR RNA); SEVERE ACUTE RESPIRATORY SYNDROME CORONAVIRUS 2 (SARS-COV-2) (CORONAVIRUS DISEASE [COVID-19]), AMPLIFIED PROBE TECHNIQUE, MAKING USE OF HIGH THROUGHPUT TECHNOLOGIES AS DESCRIBED BY CMS-2020-01-R: HCPCS

## 2021-08-10 PROCEDURE — 99213 OFFICE O/P EST LOW 20 MIN: CPT | Mod: CS | Performed by: NURSE PRACTITIONER

## 2021-08-10 RX ORDER — ACETAMINOPHEN 325 MG/1
650 TABLET ORAL EVERY 4 HOURS PRN
COMMUNITY

## 2021-08-10 ASSESSMENT — ENCOUNTER SYMPTOMS
HEADACHES: 1
SORE THROAT: 0
COUGH: 1
CHILLS: 0
MYALGIAS: 1
SHORTNESS OF BREATH: 0

## 2021-08-10 NOTE — LETTER
August 10, 2021    To Whom It May Concern:         This is confirmation that Mikal Rushing attended his scheduled appointment with RICH Wyman on 8/10/21.    Your employee was seen in our clinic today.  A concern for COVID-19 has been identified and testing is in progress.  We are asking you to excuse absences as well following self-isolation protocol per Center for Disease Control (CDC).  You employee will be able to access test results through our electronic delivery system called [employer].     If the results of testing are negative, and once there has been no fever (> than 100.4 F)  without treatment, and no vomiting or diarrhea for at least 24 hours, then return to work is approved.    If the results of testing are positive than your employer will be contacted by the [facility] or [facility] department for further instructions on duration of self-isolation and return to work protocol.  In general, this will also follow the CDC guidelines with a minimum of 10 days from the onset of symptoms and without fever, vomiting, or diarrhea as above.     In general, repeat testing is NOT necessary and not offered through the [facility] care.     This is the only note that will be provided from the [facility] for this visit.  Your employee will require an appointment with a primary care provider if FMLA or disability forms are required.    Please excuse his absence starting 8/3/2021 due to his symptoms.          If you have any questions please do not hesitate to call me at the phone number listed below.    Sincerely,          Kezia Hutton A.P.R.N.  [phone]

## 2021-08-10 NOTE — PROGRESS NOTES
Subjective:     Mikal Rushing is a 66 y.o. male who presents for Cough (onset last week, sneezing, needs work release for missing work due to illness)      Cough  This is a new problem. The current episode started in the past 7 days (7 days ago Mikal developed dry cough, nasal congestion and headache). The problem has been rapidly improving. The problem occurs constantly. The cough is non-productive. Associated symptoms include headaches, myalgias, nasal congestion and postnasal drip. Pertinent negatives include no chills, sore throat or shortness of breath. Nothing aggravates the symptoms. Treatments tried: Nyquil/Dayquil. The treatment provided moderate relief.   He is vaccinated against COVID.      Review of Systems   Constitutional: Negative for chills.   HENT: Positive for postnasal drip. Negative for sore throat.    Respiratory: Positive for cough. Negative for shortness of breath.    Musculoskeletal: Positive for myalgias.   Neurological: Positive for headaches.       PMH:   Past Medical History:   Diagnosis Date   • Hyperlipidemia      ALLERGIES:   Allergies   Allergen Reactions   • Pcn [Penicillins] Diarrhea     Abdominal discomfort     SURGHX: No past surgical history on file.  SOCHX:   Social History     Socioeconomic History   • Marital status: Single     Spouse name: Not on file   • Number of children: Not on file   • Years of education: Not on file   • Highest education level: Not on file   Occupational History   • Not on file   Tobacco Use   • Smoking status: Current Every Day Smoker     Packs/day: 0.50     Years: 20.00     Pack years: 10.00     Types: Cigarettes   • Smokeless tobacco: Never Used   • Tobacco comment: 3-4 cigs daily   Substance and Sexual Activity   • Alcohol use: Yes     Comment: Maybe once every couple months   • Drug use: Not Currently     Comment: socially, a few times per year, may drink 5-6shots each time   • Sexual activity: Yes     Partners: Male     Comment:  ", no children   Other Topics Concern   • Not on file   Social History Narrative   • Not on file     Social Determinants of Health     Financial Resource Strain:    • Difficulty of Paying Living Expenses:    Food Insecurity:    • Worried About Running Out of Food in the Last Year:    • Ran Out of Food in the Last Year:    Transportation Needs:    • Lack of Transportation (Medical):    • Lack of Transportation (Non-Medical):    Physical Activity:    • Days of Exercise per Week:    • Minutes of Exercise per Session:    Stress:    • Feeling of Stress :    Social Connections:    • Frequency of Communication with Friends and Family:    • Frequency of Social Gatherings with Friends and Family:    • Attends Taoist Services:    • Active Member of Clubs or Organizations:    • Attends Club or Organization Meetings:    • Marital Status:    Intimate Partner Violence:    • Fear of Current or Ex-Partner:    • Emotionally Abused:    • Physically Abused:    • Sexually Abused:      FH:   Family History   Problem Relation Age of Onset   • Hypertension Mother    • Diabetes Father    • Diabetes Sister    • Diabetes Brother    • Diabetes Brother    • Diabetes Brother          Objective:   /68   Pulse 80   Temp 36.7 °C (98.1 °F)   Resp 15   Ht 1.651 m (5' 5\")   Wt 63.5 kg (140 lb)   SpO2 99%   BMI 23.30 kg/m²     Physical Exam  Vitals and nursing note reviewed.   Constitutional:       General: He is not in acute distress.     Appearance: Normal appearance. He is not ill-appearing.   HENT:      Head: Normocephalic and atraumatic.      Right Ear: Tympanic membrane, ear canal and external ear normal. There is no impacted cerumen.      Left Ear: Tympanic membrane, ear canal and external ear normal. There is no impacted cerumen.      Nose: No congestion or rhinorrhea.      Mouth/Throat:      Mouth: Mucous membranes are moist.      Pharynx: No oropharyngeal exudate or posterior oropharyngeal erythema.   Eyes:      " General:         Right eye: No discharge.         Left eye: No discharge.      Extraocular Movements: Extraocular movements intact.      Pupils: Pupils are equal, round, and reactive to light.   Cardiovascular:      Rate and Rhythm: Normal rate and regular rhythm.      Pulses: Normal pulses.      Heart sounds: Normal heart sounds.   Pulmonary:      Effort: Pulmonary effort is normal. No respiratory distress.      Breath sounds: Normal breath sounds. No stridor. No wheezing, rhonchi or rales.   Chest:      Chest wall: No tenderness.   Abdominal:      General: Abdomen is flat. Bowel sounds are normal.      Palpations: Abdomen is soft.      Tenderness: There is no abdominal tenderness. There is no right CVA tenderness or left CVA tenderness.   Musculoskeletal:         General: Normal range of motion.      Cervical back: Normal range of motion and neck supple. No tenderness.   Lymphadenopathy:      Cervical: No cervical adenopathy.   Skin:     General: Skin is warm and dry.      Capillary Refill: Capillary refill takes less than 2 seconds.   Neurological:      General: No focal deficit present.      Mental Status: He is alert and oriented to person, place, and time. Mental status is at baseline.   Psychiatric:         Mood and Affect: Mood normal.         Behavior: Behavior normal.         Thought Content: Thought content normal.         Judgment: Judgment normal.         Assessment/Plan:   Assessment    1. Suspected COVID-19 virus infection  COVID/SARS CoV-2 PCR     Has a symptoms have greatly improved however, due to his illness he would like to have a Covid test performed to make sure that he is safe to go back to work. Pt was tested for COVID today. I will call with results. Upon entering the room PPE was worn throughout the duration of his visit for both provider and patient. Mask of patient briefly removed for examination of oropharynx. PT was educated to remain in self isolation for at least 10 days from onset of  symptoms followed by an additional 24-hour period of being symptom-free without the use of symptom altering medication.      AVS handout given and reviewed with patient. Pt educated on red flags and when to seek treatment back in ER or UC.

## 2021-08-11 LAB
COVID ORDER STATUS COVID19: NORMAL
SARS-COV-2 RNA RESP QL NAA+PROBE: NOTDETECTED
SPECIMEN SOURCE: NORMAL

## 2021-11-24 ENCOUNTER — OFFICE VISIT (OUTPATIENT)
Dept: MEDICAL GROUP | Facility: MEDICAL CENTER | Age: 66
End: 2021-11-24
Payer: COMMERCIAL

## 2021-11-24 VITALS
HEART RATE: 77 BPM | HEIGHT: 65 IN | OXYGEN SATURATION: 99 % | TEMPERATURE: 98.4 F | SYSTOLIC BLOOD PRESSURE: 118 MMHG | BODY MASS INDEX: 21.38 KG/M2 | DIASTOLIC BLOOD PRESSURE: 60 MMHG | WEIGHT: 128.31 LBS

## 2021-11-24 DIAGNOSIS — E78.5 DYSLIPIDEMIA: ICD-10-CM

## 2021-11-24 DIAGNOSIS — Z12.5 SCREENING PSA (PROSTATE SPECIFIC ANTIGEN): ICD-10-CM

## 2021-11-24 DIAGNOSIS — Z00.00 PREVENTATIVE HEALTH CARE: ICD-10-CM

## 2021-11-24 DIAGNOSIS — M54.50 ACUTE BILATERAL LOW BACK PAIN WITHOUT SCIATICA: ICD-10-CM

## 2021-11-24 DIAGNOSIS — R10.13 DYSPEPSIA: ICD-10-CM

## 2021-11-24 DIAGNOSIS — S09.90XA RECENT HEAD TRAUMA, INITIAL ENCOUNTER: ICD-10-CM

## 2021-11-24 PROCEDURE — 99214 OFFICE O/P EST MOD 30 MIN: CPT | Performed by: PHYSICIAN ASSISTANT

## 2021-11-24 RX ORDER — OMEPRAZOLE 20 MG/1
20 CAPSULE, DELAYED RELEASE ORAL DAILY
Qty: 90 CAPSULE | Refills: 1 | Status: SHIPPED | OUTPATIENT
Start: 2021-11-24

## 2021-11-24 RX ORDER — CYCLOBENZAPRINE HCL 5 MG
5 TABLET ORAL 3 TIMES DAILY PRN
Qty: 30 TABLET | Refills: 0 | Status: SHIPPED | OUTPATIENT
Start: 2021-11-24

## 2021-11-24 RX ORDER — NAPROXEN 500 MG/1
500 TABLET ORAL 2 TIMES DAILY WITH MEALS
Qty: 60 TABLET | Refills: 1 | Status: SHIPPED | OUTPATIENT
Start: 2021-11-24

## 2021-11-24 ASSESSMENT — PATIENT HEALTH QUESTIONNAIRE - PHQ9: CLINICAL INTERPRETATION OF PHQ2 SCORE: 0

## 2021-11-24 NOTE — LETTER
November 24, 2021         Patient: Mikal Rushing   YOB: 1955   Date of Visit: 11/24/2021           To Whom it May Concern:    Mikal Rushing was seen in my clinic on 11/24/2021. He may return to work on December 1, 2021 without restrictions.    If you have any questions or concerns, please don't hesitate to call. Thank you.        Sincerely,           Terrence Hill P.A.-C.  Electronically Signed

## 2021-11-25 NOTE — PROGRESS NOTES
Subjective:   Mikal Rushing is a 66 y.o. male here today for status post fall with head trauma and then developing low back pain without radiation.    Acute head trauma  This is a pleasant 66-year-old male accompanied by his older brother.  Also with an  on audio.  On the 15th of this month he slipped at work.  Slipped that at [facility].  The floor was wet.  Fell backwards and hit the back of his head.  Did not lose consciousness.  Did fine and remain working.  Refused to be evaluated through Worker's Comp.  He has some residual headaches but has had no issues with nausea vomiting.  No change of vision.  No further issues such as loss of consciousness.  He is here today for his back.    Acute bilateral low back pain without sciatica  Back pain started in earnest on the 17th.  This was 2 days after his fall.  By Thursday he was unable to work.  Working and walking around the facility cause severe pain.  He has 2 jobs.  Has been unable to work at both because of the pain.  Pain is slightly above the buttocks.  No radiation.  Severe pain.  Pain is worse with movement.  He has slowly improved with his mobility.  The other day he did have some cramping of his right lower leg.  Denies any urinary issues.  No pain down the legs.  He has been taking Tylenol without any improvement.       Current medicines (including changes today)  Current Outpatient Medications   Medication Sig Dispense Refill   • cyclobenzaprine (FLEXERIL) 5 mg tablet Take 1 Tablet by mouth 3 times a day as needed. 30 Tablet 0   • naproxen (NAPROSYN) 500 MG Tab Take 1 Tablet by mouth 2 times a day with meals. 60 Tablet 1   • omeprazole (PRILOSEC) 20 MG delayed-release capsule Take 1 Capsule by mouth every day. Half hour prior to same meal daily. 90 Capsule 1   • acetaminophen (TYLENOL) 325 MG Tab Take 650 mg by mouth every four hours as needed.       No current facility-administered medications for this visit.     He  has a past  "medical history of Hyperlipidemia. He also has no past medical history of Diabetes or Hypertension.    Social History and Family History were reviewed and updated.    ROS   No chest pain, no shortness of breath, no abdominal pain and all other systems were reviewed and are negative.       Objective:     /60 (BP Location: Left arm, Patient Position: Sitting, BP Cuff Size: Adult)   Pulse 77   Temp 36.9 °C (98.4 °F) (Temporal)   Ht 1.651 m (5' 5\")   Wt 58.2 kg (128 lb 4.9 oz)   SpO2 99%  Body mass index is 21.35 kg/m².   Physical Exam:  Constitutional: Alert, no distress.  Skin: Warm, dry, good turgor, no rashes in visible areas.  Eye: Equal, round and reactive, conjunctiva clear, lids normal.  ENMT: Lips without lesions, good dentition, oropharynx clear.  Neck: Trachea midline, no masses.   Lymph: No cervical or supraclavicular lymphadenopathy  Respiratory: Unlabored respiratory effort, lungs appear clear, no wheezes.  Cardiovascular: Regular rate and rhythm.  Neuro: CN II through XII intact.  Musculoskeletal: No spinal tenderness noted.  No swelling of the lumbar spine.  Psych: Alert and oriented x3, normal affect and mood.        Assessment and Plan:   The following treatment plan was discussed    1. Recent head trauma, initial encounter  Acute, new onset condition status post fall.  No significant sequelae that would be concerning.  Discussed imaging not needed.  It has been over a week.  May continue Tylenol as needed.  Expect headaches to eventually improve.    2. Acute bilateral low back pain without sciatica  Acute, new onset condition.  Unknown if he has a fracture as he did not develop pain until a couple days later.  Ordered x-ray of his lumbar spine.  Provided him naproxen and a muscle relaxer to take prior to bedtime.  Note written to return to work on the first.  If symptoms are not improving he is to contact me to order a CT spine.  - DX-LUMBAR SPINE-2 OR 3 VIEWS; Future  - cyclobenzaprine " (FLEXERIL) 5 mg tablet; Take 1 Tablet by mouth 3 times a day as needed.  Dispense: 30 Tablet; Refill: 0  - naproxen (NAPROSYN) 500 MG Tab; Take 1 Tablet by mouth 2 times a day with meals.  Dispense: 60 Tablet; Refill: 1    3. Dyslipidemia  Chronic condition.  Concerned about prior history of elevated cholesterol which has been over 120 for several years.  Discussed CT cardiac scoring.  Cash pay.  Contact imaging.  - CT-CARDIAC SCORING; Future    4. Dyspepsia  Chronic condition.  Discussed must take food with naproxen.  Provided prescription for omeprazole to take as directed.  - omeprazole (PRILOSEC) 20 MG delayed-release capsule; Take 1 Capsule by mouth every day. Half hour prior to same meal daily.  Dispense: 90 Capsule; Refill: 1    5. Preventative health care  Order labs fasting.  He will be contacted with the results.  - CBC WITH DIFFERENTIAL; Future  - Lipid Profile; Future  - Comp Metabolic Panel; Future  - HEMOGLOBIN A1C; Future    6. Screening PSA (prostate specific antigen)  PSA ordered.  Screening.  - PROSTATE SPECIFIC AG SCREENING; Future         Followup: Return in about 6 months (around 5/24/2022), or if symptoms worsen or fail to improve.    Please note that this dictation was created using voice recognition software. I have made every reasonable attempt to correct obvious errors, but I expect that there are errors of grammar and possibly content that I did not discover before finalizing the note.

## 2021-11-25 NOTE — ASSESSMENT & PLAN NOTE
This is a pleasant 66-year-old male accompanied by his older brother.  Also with an  on audio.  On the 15th of this month he slipped at work.  Slipped that at [facility].  The floor was wet.  Fell backwards and hit the back of his head.  Did not lose consciousness.  Did fine and remain working.  Refused to be evaluated through Worker's Comp.  He has some residual headaches but has had no issues with nausea vomiting.  No change of vision.  No further issues such as loss of consciousness.  He is here today for his back.

## 2021-11-25 NOTE — ASSESSMENT & PLAN NOTE
Back pain started in earnest on the 17th.  This was 2 days after his fall.  By Thursday he was unable to work.  Working and walking around the facility cause severe pain.  He has 2 jobs.  Has been unable to work at both because of the pain.  Pain is slightly above the buttocks.  No radiation.  Severe pain.  Pain is worse with movement.  He has slowly improved with his mobility.  The other day he did have some cramping of his right lower leg.  Denies any urinary issues.  No pain down the legs.  He has been taking Tylenol without any improvement.

## 2021-11-27 ENCOUNTER — HOSPITAL ENCOUNTER (OUTPATIENT)
Dept: RADIOLOGY | Facility: MEDICAL CENTER | Age: 66
End: 2021-11-27
Attending: PHYSICIAN ASSISTANT
Payer: COMMERCIAL

## 2021-11-27 ENCOUNTER — HOSPITAL ENCOUNTER (OUTPATIENT)
Dept: LAB | Facility: MEDICAL CENTER | Age: 66
End: 2021-11-27
Attending: PHYSICIAN ASSISTANT
Payer: COMMERCIAL

## 2021-11-27 DIAGNOSIS — Z00.00 PREVENTATIVE HEALTH CARE: ICD-10-CM

## 2021-11-27 DIAGNOSIS — M54.50 ACUTE BILATERAL LOW BACK PAIN WITHOUT SCIATICA: ICD-10-CM

## 2021-11-27 DIAGNOSIS — Z12.5 SCREENING PSA (PROSTATE SPECIFIC ANTIGEN): ICD-10-CM

## 2021-11-27 LAB
ALBUMIN SERPL BCP-MCNC: 4.4 G/DL (ref 3.2–4.9)
ALBUMIN/GLOB SERPL: 1.8 G/DL
ALP SERPL-CCNC: 158 U/L (ref 30–99)
ALT SERPL-CCNC: 28 U/L (ref 2–50)
ANION GAP SERPL CALC-SCNC: 8 MMOL/L (ref 7–16)
AST SERPL-CCNC: 19 U/L (ref 12–45)
BASOPHILS # BLD AUTO: 1 % (ref 0–1.8)
BASOPHILS # BLD: 0.05 K/UL (ref 0–0.12)
BILIRUB SERPL-MCNC: 0.7 MG/DL (ref 0.1–1.5)
BUN SERPL-MCNC: 10 MG/DL (ref 8–22)
CALCIUM SERPL-MCNC: 9.7 MG/DL (ref 8.5–10.5)
CHLORIDE SERPL-SCNC: 106 MMOL/L (ref 96–112)
CHOLEST SERPL-MCNC: 248 MG/DL (ref 100–199)
CO2 SERPL-SCNC: 27 MMOL/L (ref 20–33)
CREAT SERPL-MCNC: 0.76 MG/DL (ref 0.5–1.4)
EOSINOPHIL # BLD AUTO: 0.29 K/UL (ref 0–0.51)
EOSINOPHIL NFR BLD: 5.7 % (ref 0–6.9)
ERYTHROCYTE [DISTWIDTH] IN BLOOD BY AUTOMATED COUNT: 44.8 FL (ref 35.9–50)
EST. AVERAGE GLUCOSE BLD GHB EST-MCNC: 117 MG/DL
FASTING STATUS PATIENT QL REPORTED: NORMAL
GLOBULIN SER CALC-MCNC: 2.5 G/DL (ref 1.9–3.5)
GLUCOSE SERPL-MCNC: 94 MG/DL (ref 65–99)
HBA1C MFR BLD: 5.7 % (ref 4–5.6)
HCT VFR BLD AUTO: 50.4 % (ref 42–52)
HDLC SERPL-MCNC: 59 MG/DL
HGB BLD-MCNC: 16.8 G/DL (ref 14–18)
IMM GRANULOCYTES # BLD AUTO: 0.01 K/UL (ref 0–0.11)
IMM GRANULOCYTES NFR BLD AUTO: 0.2 % (ref 0–0.9)
LDLC SERPL CALC-MCNC: 134 MG/DL
LYMPHOCYTES # BLD AUTO: 1.85 K/UL (ref 1–4.8)
LYMPHOCYTES NFR BLD: 36.3 % (ref 22–41)
MCH RBC QN AUTO: 31.8 PG (ref 27–33)
MCHC RBC AUTO-ENTMCNC: 33.3 G/DL (ref 33.7–35.3)
MCV RBC AUTO: 95.5 FL (ref 81.4–97.8)
MONOCYTES # BLD AUTO: 0.4 K/UL (ref 0–0.85)
MONOCYTES NFR BLD AUTO: 7.8 % (ref 0–13.4)
NEUTROPHILS # BLD AUTO: 2.5 K/UL (ref 1.82–7.42)
NEUTROPHILS NFR BLD: 49 % (ref 44–72)
NRBC # BLD AUTO: 0 K/UL
NRBC BLD-RTO: 0 /100 WBC
PLATELET # BLD AUTO: 289 K/UL (ref 164–446)
PMV BLD AUTO: 9.7 FL (ref 9–12.9)
POTASSIUM SERPL-SCNC: 4.3 MMOL/L (ref 3.6–5.5)
PROT SERPL-MCNC: 6.9 G/DL (ref 6–8.2)
PSA SERPL-MCNC: 2.51 NG/ML (ref 0–4)
RBC # BLD AUTO: 5.28 M/UL (ref 4.7–6.1)
SODIUM SERPL-SCNC: 141 MMOL/L (ref 135–145)
TRIGL SERPL-MCNC: 273 MG/DL (ref 0–149)
WBC # BLD AUTO: 5.1 K/UL (ref 4.8–10.8)

## 2021-11-27 PROCEDURE — 85025 COMPLETE CBC W/AUTO DIFF WBC: CPT

## 2021-11-27 PROCEDURE — 72100 X-RAY EXAM L-S SPINE 2/3 VWS: CPT

## 2021-11-27 PROCEDURE — 80053 COMPREHEN METABOLIC PANEL: CPT

## 2021-11-27 PROCEDURE — 80061 LIPID PANEL: CPT

## 2021-11-27 PROCEDURE — 83036 HEMOGLOBIN GLYCOSYLATED A1C: CPT

## 2021-11-27 PROCEDURE — 84153 ASSAY OF PSA TOTAL: CPT

## 2021-11-27 PROCEDURE — 36415 COLL VENOUS BLD VENIPUNCTURE: CPT

## 2021-11-30 ENCOUNTER — TELEPHONE (OUTPATIENT)
Dept: MEDICAL GROUP | Facility: MEDICAL CENTER | Age: 66
End: 2021-11-30

## 2021-11-30 NOTE — TELEPHONE ENCOUNTER
----- Message from Terrence Hill P.A.-C. sent at 11/28/2021  6:25 PM PST -----  Needs .  Please contact Mikal. Labs are stable but cholesterol is higher than previous.  Please make sure he get the CT scan for his calcium in heart vessels preformed.  Thank you.    Sincerely,    Terrence

## 2021-11-30 NOTE — TELEPHONE ENCOUNTER
Phone Number Called: [phone]    Call outcome: Spoke to patient regarding message below.    Message: spoke with Mikal about the results, he stated he understood and he has an apt for the CT scan for 12/1/21. I asked his how his back pain was doing, and he stated it's getting a bit better but its still uncomfortable for him.

## 2021-12-01 ENCOUNTER — OFFICE VISIT (OUTPATIENT)
Dept: MEDICAL GROUP | Facility: MEDICAL CENTER | Age: 66
End: 2021-12-01
Payer: COMMERCIAL

## 2021-12-01 ENCOUNTER — HOSPITAL ENCOUNTER (OUTPATIENT)
Dept: RADIOLOGY | Facility: MEDICAL CENTER | Age: 66
End: 2021-12-01
Attending: PHYSICIAN ASSISTANT
Payer: COMMERCIAL

## 2021-12-01 VITALS
HEART RATE: 76 BPM | SYSTOLIC BLOOD PRESSURE: 128 MMHG | OXYGEN SATURATION: 98 % | WEIGHT: 133 LBS | TEMPERATURE: 98.7 F | DIASTOLIC BLOOD PRESSURE: 56 MMHG | BODY MASS INDEX: 22.16 KG/M2 | HEIGHT: 65 IN

## 2021-12-01 DIAGNOSIS — E78.5 DYSLIPIDEMIA: ICD-10-CM

## 2021-12-01 DIAGNOSIS — M54.50 ACUTE BILATERAL LOW BACK PAIN WITHOUT SCIATICA: ICD-10-CM

## 2021-12-01 DIAGNOSIS — R73.03 PREDIABETES: ICD-10-CM

## 2021-12-01 PROBLEM — S09.90XA ACUTE HEAD TRAUMA: Status: RESOLVED | Noted: 2021-11-24 | Resolved: 2021-12-01

## 2021-12-01 PROBLEM — R93.1 AGATSTON CORONARY ARTERY CALCIUM SCORE BETWEEN 100 AND 199: Status: ACTIVE | Noted: 2021-12-01

## 2021-12-01 PROCEDURE — 99214 OFFICE O/P EST MOD 30 MIN: CPT | Performed by: PHYSICIAN ASSISTANT

## 2021-12-01 PROCEDURE — 4410556 CT-CARDIAC SCORING (SELF PAY ONLY)

## 2021-12-01 ASSESSMENT — FIBROSIS 4 INDEX: FIB4 SCORE: 0.83

## 2021-12-02 NOTE — ASSESSMENT & PLAN NOTE
This is a pleasant 66-year-old male accompanied by his brother.   is on the teleprompter.  He is doing better with his low back.  Denies any radiation.  The other day did have to walk 8 blocks because the bus is not running.  Developed some swelling of his feet as well as some numbness when he [place] sitting at the bus station.  That only occurred once.  Otherwise back pain has improved.  He has been taking naproxen but it does bother his stomach.  Start taking omeprazole so it was better to handle the naproxen.  He is unsure if he will be able to return to work.  He is slated to go back tomorrow.  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

## 2021-12-02 NOTE — PROGRESS NOTES
Subjective:   Mikal Rushing is a 66 y.o. male here today for FMLA request for acute low back pain status post fall.  And review of labs.    Acute bilateral low back pain without sciatica  This is a pleasant 66-year-old male accompanied by his brother.   is on the teleprompter.  He is doing better with his low back.  Denies any radiation.  The other day did have to walk 8 blocks because the bus is not running.  Developed some swelling of his feet as well as some numbness when he was sitting at the bus station.  That only occurred once.  Otherwise back pain has improved.  He has been taking naproxen but it does bother his stomach.  Start taking omeprazole so it was better to handle the naproxen.  He is unsure if he will be able to return to work.  He is slated to go back tomorrow.  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    Dyslipidemia  Cholesterol is elevated.  LDL in the 130s.  His CT calcium score was at 118 in the LAD.  Placed him above the 25th percentile.  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       Current medicines (including changes today)  Current Outpatient Medications   Medication Sig Dispense Refill   • cyclobenzaprine (FLEXERIL) 5 mg tablet Take 1 Tablet by mouth 3 times a day as needed. 30 Tablet 0   • naproxen (NAPROSYN) 500 MG Tab Take 1 Tablet by mouth 2 times a day with meals. 60 Tablet 1   • omeprazole (PRILOSEC) 20 MG delayed-release capsule Take 1 Capsule by mouth every day. Half hour prior to same meal daily. 90 Capsule 1   • acetaminophen (TYLENOL) 325 MG Tab Take 650 mg by mouth every four hours as needed.       No current facility-administered medications for this visit.     He  has a past medical history of Hyperlipidemia. He also has no past medical history of Diabetes or Hypertension.    Social History and Family History were reviewed and updated.    ROS   No chest pain, no shortness of breath, no abdominal pain  "and all other systems were reviewed and are negative.       Objective:     /56 (BP Location: Right arm, Patient Position: Sitting, BP Cuff Size: Adult)   Pulse 76   Temp 37.1 °C (98.7 °F) (Temporal)   Ht 1.651 m (5' 5\")   Wt 60.3 kg (133 lb)   SpO2 98%  Body mass index is 22.13 kg/m².   Physical Exam:  Constitutional: Alert, no distress.  Skin: Warm, dry, good turgor, no rashes in visible areas.  Eye: Equal, round and reactive, conjunctiva clear, lids normal.  ENMT: Lips without lesions, good dentition, oropharynx clear.  Neck: Trachea midline, no masses.   Lymph: No cervical or supraclavicular lymphadenopathy  Respiratory: Unlabored respiratory effort, lungs appear clear, no wheezes.  Cardiovascular: Regular rate and rhythm.  Psych: Alert and oriented x3, normal affect and mood.        Assessment and Plan:   The following treatment plan was discussed    1. Acute bilateral low back pain without sciatica  Acute, new onset condition status post fall.  Still dealing with symptoms.  I will provide him 1 more week off and he will return to work on the ninth.  FMLA was completed and returned to patient.  Advised him to contact me if he does not improve.  He has an appointment for next Wednesday in case he is not improving.  We will leave that appointment open.    2. Prediabetes  Chronic condition.  A1c stable at 5.7.  Will monitor yearly.    3. Dyslipidemia  Chronic condition.  Advised to watch fatty food intake.  Discussed CT calcium results.  We will hold off on starting medication and continue to follow his cholesterol.  If better control is not obtained we will suggest a statin.         Followup: Return in about 3 months (around 3/1/2022), or if symptoms worsen or fail to improve, for Has appointment on the eighth.    Please note that this dictation was created using voice recognition software. I have made every reasonable attempt to correct obvious errors, but I expect that there are errors of grammar and " possibly content that I did not discover before finalizing the note.

## 2021-12-02 NOTE — ASSESSMENT & PLAN NOTE
Cholesterol is elevated.  LDL in the 130s.  His CT calcium score was at 118 in the LAD.  Placed him above the 25th percentile.  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

## 2021-12-08 ENCOUNTER — OFFICE VISIT (OUTPATIENT)
Dept: MEDICAL GROUP | Facility: MEDICAL CENTER | Age: 66
End: 2021-12-08
Payer: COMMERCIAL

## 2021-12-08 VITALS
WEIGHT: 132.06 LBS | BODY MASS INDEX: 22 KG/M2 | SYSTOLIC BLOOD PRESSURE: 116 MMHG | OXYGEN SATURATION: 98 % | RESPIRATION RATE: 16 BRPM | HEART RATE: 77 BPM | DIASTOLIC BLOOD PRESSURE: 60 MMHG | HEIGHT: 65 IN | TEMPERATURE: 98.4 F

## 2021-12-08 DIAGNOSIS — E78.5 DYSLIPIDEMIA: ICD-10-CM

## 2021-12-08 DIAGNOSIS — M54.50 ACUTE BILATERAL LOW BACK PAIN WITHOUT SCIATICA: ICD-10-CM

## 2021-12-08 PROCEDURE — 99213 OFFICE O/P EST LOW 20 MIN: CPT | Performed by: PHYSICIAN ASSISTANT

## 2021-12-08 ASSESSMENT — FIBROSIS 4 INDEX: FIB4 SCORE: 0.83

## 2021-12-08 NOTE — LETTER
December 8, 2021         Patient: Mikal Rushing   YOB: 1955   Date of Visit: 12/8/2021           To Whom it May Concern:    Mikal Rushing was seen in my clinic on 12/8/2021. He is unable to lift more than 10 pounds during work hours because of a recent back injury.  I anticipate this to be in effect until 01/03/2022.    If you have any questions or concerns, please don't hesitate to call.        Sincerely,           Terrence Hill P.A.-C.  Electronically Signed

## 2021-12-08 NOTE — PROGRESS NOTES
Subjective:   Mikal Rushing is a 66 y.o. male here today for acute low back pain and dyslipidemia.    Acute bilateral low back pain without sciatica  This is a pleasant 66-year-old male accompanied by his brother.   helping.  His back pain has improved tremendously.  He is very happy that it is improving.  Still does have back pain.  Is trying to return to work but was asked to get some accommodations as far as how much to lift.  Typically a sack of beans is 25 pounds.  He has to bend down and obvious with using the strength of his thighs lift the bag of beings.  Has assisted then twist to move the beans to another location.  He is currently wearing a brace because it does help with taking pressure off of the back.  He is due to return to work on Thursday.    Dyslipidemia  He has made some dietary changes and is eating healthier.  No more stewart in the morning.  He states the culprit is at work as a .  Will be returning to work on Thursday.  Cholesterol is elevated.  CT calcium score was at 118 with all the plaque noted in the LAD.       Current medicines (including changes today)  Current Outpatient Medications   Medication Sig Dispense Refill   • cyclobenzaprine (FLEXERIL) 5 mg tablet Take 1 Tablet by mouth 3 times a day as needed. 30 Tablet 0   • naproxen (NAPROSYN) 500 MG Tab Take 1 Tablet by mouth 2 times a day with meals. 60 Tablet 1   • omeprazole (PRILOSEC) 20 MG delayed-release capsule Take 1 Capsule by mouth every day. Half hour prior to same meal daily. 90 Capsule 1   • acetaminophen (TYLENOL) 325 MG Tab Take 650 mg by mouth every four hours as needed.       No current facility-administered medications for this visit.     He  has a past medical history of Hyperlipidemia. He also has no past medical history of Diabetes or Hypertension.    Social History and Family History were reviewed and updated.    ROS   No chest pain, no shortness of breath, no abdominal pain and  "all other systems were reviewed and are negative.       Objective:     /60 (BP Location: Left arm, Patient Position: Sitting, BP Cuff Size: Adult)   Pulse 77   Temp 36.9 °C (98.4 °F) (Temporal)   Resp 16   Ht 1.651 m (5' 5\")   Wt 59.9 kg (132 lb 0.9 oz)   SpO2 98%  Body mass index is 21.98 kg/m².   Physical Exam:  Constitutional: Alert, no distress.  Skin: Warm, dry, good turgor, no rashes in visible areas.  Eye: Equal, round and reactive, conjunctiva clear, lids normal.  ENMT: Lips without lesions, good dentition, oropharynx clear.  Neck: Trachea midline, no masses.   Lymph: No cervical or supraclavicular lymphadenopathy  Respiratory: Unlabored respiratory effort, lungs appear clear, no wheezes.  Cardiovascular: Regular rate and rhythm.  Psych: Alert and oriented x3, normal affect and mood.        Assessment and Plan:   The following treatment plan was discussed    1. Acute bilateral low back pain without sciatica  Acute, new onset condition.  Status post fall at work.  Improving.  Very grateful for that.  Letter provided to refrain from lifting anything more than 10 pounds until 3 January.  I will see him in 3 months time.  He is to contact me with any concerns.  To expect gradual resolution of his back pain.    2. Dyslipidemia  Chronic condition.  Will check cholesterol profile right before the first.  If not improved will start a statin.  - Lipid Profile; Future         Followup: Return if symptoms worsen or fail to improve.    Please note that this dictation was created using voice recognition software. I have made every reasonable attempt to correct obvious errors, but I expect that there are errors of grammar and possibly content that I did not discover before finalizing the note.           "

## 2021-12-08 NOTE — ASSESSMENT & PLAN NOTE
This is a pleasant 66-year-old male accompanied by his brother.   helping.  His back pain has improved tremendously.  He is very happy that it is improving.  Still does have back pain.  Is trying to return to work but was asked to get some accommodations as far as how much to lift.  Typically a sack of beans is 25 pounds.  He has to bend down and obvious with using the strength of his thighs lift the bag of beings.  Has assisted then twist to move the beans to another location.  He is currently wearing a brace because it does help with taking pressure off of the back.  He is due to return to work on Thursday.

## 2021-12-08 NOTE — ASSESSMENT & PLAN NOTE
He has made some dietary changes and is eating healthier.  No more stewart in the morning.  He states the culprit is at work as a .  Will be returning to work on Thursday.  Cholesterol is elevated.  CT calcium score was at 118 with all the plaque noted in the LAD.

## 2023-01-09 ENCOUNTER — TELEPHONE (OUTPATIENT)
Dept: HEALTH INFORMATION MANAGEMENT | Facility: OTHER | Age: 68
End: 2023-01-09
Payer: MEDICARE

## 2023-08-18 ENCOUNTER — OFFICE VISIT (OUTPATIENT)
Dept: URGENT CARE | Facility: PHYSICIAN GROUP | Age: 68
End: 2023-08-18
Payer: MEDICARE

## 2023-08-18 VITALS
RESPIRATION RATE: 16 BRPM | HEART RATE: 84 BPM | TEMPERATURE: 98 F | WEIGHT: 148 LBS | OXYGEN SATURATION: 100 % | BODY MASS INDEX: 24.66 KG/M2 | HEIGHT: 65 IN

## 2023-08-18 DIAGNOSIS — R03.0 ELEVATED BLOOD PRESSURE READING: ICD-10-CM

## 2023-08-18 DIAGNOSIS — J22 LOWER RESPIRATORY TRACT INFECTION: ICD-10-CM

## 2023-08-18 PROCEDURE — 99214 OFFICE O/P EST MOD 30 MIN: CPT | Performed by: REGISTERED NURSE

## 2023-08-18 RX ORDER — AZITHROMYCIN 250 MG/1
TABLET, FILM COATED ORAL
Qty: 6 TABLET | Refills: 0 | Status: SHIPPED | OUTPATIENT
Start: 2023-08-18

## 2023-08-18 RX ORDER — DEXTROMETHORPHAN HYDROBROMIDE AND PROMETHAZINE HYDROCHLORIDE 15; 6.25 MG/5ML; MG/5ML
5 SYRUP ORAL EVERY 4 HOURS PRN
Qty: 120 ML | Refills: 0 | Status: SHIPPED | OUTPATIENT
Start: 2023-08-18

## 2023-08-18 ASSESSMENT — ENCOUNTER SYMPTOMS
SEIZURES: 0
LOSS OF CONSCIOUSNESS: 0
SHORTNESS OF BREATH: 0
CHILLS: 0
VOMITING: 0
DIZZINESS: 0
ABDOMINAL PAIN: 0
HEMOPTYSIS: 0
FEVER: 0
DIARRHEA: 0
NECK PAIN: 0

## 2023-08-18 ASSESSMENT — FIBROSIS 4 INDEX: FIB4 SCORE: 0.86

## 2023-08-18 NOTE — PROGRESS NOTES
Subjective:   Mikal Bhardwaj is a 68 y.o. male who presents for Coronavirus Screening (Congestion, sore throat x 1 week)    HPI  Presenting with  10 days congestion, sore throat, headache, cough is productive with greenish sputum.  The upper respiratory symptoms have seemed to improved but his cough has continued to worsen, is harsh and congested and productive with thick green sputum. They are using OTCs with minimal response. No known exposures to people with similar symptoms. No pertinent underlying medical issues. Tolerating PO. No recent antibiotic use. Immunizations current. No hx of HTN      Review of Systems   Constitutional:  Negative for chills and fever.   Respiratory:  Negative for hemoptysis and shortness of breath.    Cardiovascular:  Negative for chest pain and leg swelling.   Gastrointestinal:  Negative for abdominal pain, diarrhea and vomiting.   Musculoskeletal:  Negative for neck pain.   Skin:  Negative for rash.   Neurological:  Negative for dizziness, seizures and loss of consciousness.       Allergies   Allergen Reactions    Pcn [Penicillins] Diarrhea     Abdominal discomfort       Patient Active Problem List    Diagnosis Date Noted    Agatston coronary artery calcium score between 100 and 199 12/01/2021    Acute bilateral low back pain without sciatica 11/24/2021    Chronic pain of both knees 12/10/2019    Dyspepsia 12/10/2019    Allergic dermatitis 05/22/2019    Heart palpitations 04/24/2019    Elevated alkaline phosphatase level 06/21/2017    Prediabetes 06/21/2017    Dyslipidemia 12/07/2016       Current Outpatient Medications Ordered in Epic   Medication Sig Dispense Refill    azithromycin (ZITHROMAX) 250 MG Tab Take 2 tabs PO on day 1, take 1 tab PO day 2-5 6 Tablet 0    promethazine-dextromethorphan (PROMETHAZINE-DM) 6.25-15 MG/5ML syrup Take 5 mL by mouth every four hours as needed for Cough. 120 mL 0    naproxen (NAPROSYN) 500 MG Tab Take 1 Tablet by mouth 2 times a day with meals.  "60 Tablet 1    omeprazole (PRILOSEC) 20 MG delayed-release capsule Take 1 Capsule by mouth every day. Half hour prior to same meal daily. 90 Capsule 1    acetaminophen (TYLENOL) 325 MG Tab Take 650 mg by mouth every four hours as needed.      cyclobenzaprine (FLEXERIL) 5 mg tablet Take 1 Tablet by mouth 3 times a day as needed. (Patient not taking: Reported on 8/18/2023) 30 Tablet 0     No current Epic-ordered facility-administered medications on file.       No past surgical history on file.    Social History     Tobacco Use    Smoking status: Every Day     Packs/day: 0.50     Years: 20.00     Additional pack years: 0.00     Total pack years: 10.00     Types: Cigarettes    Smokeless tobacco: Never    Tobacco comments:     3-4 cigs daily   Vaping Use    Vaping Use: Never used   Substance Use Topics    Alcohol use: Yes     Comment: Maybe once every couple months    Drug use: Not Currently       family history includes Diabetes in his brother, brother, brother, father, and sister; Hypertension in his mother.     Problem list, medications, and allergies reviewed by myself today in Epic.     Objective:   Pulse 84   Temp 36.7 °C (98 °F) (Temporal)   Resp 16   Ht 1.651 m (5' 5\")   Wt 67.1 kg (148 lb)   SpO2 100%   BMI 24.63 kg/m²  /70    Physical Exam  Vitals and nursing note reviewed.   Constitutional:       General: He is not in acute distress.     Appearance: Normal appearance. He is well-developed. He is not ill-appearing, toxic-appearing or diaphoretic.   HENT:      Head: Normocephalic and atraumatic.      Right Ear: Tympanic membrane, ear canal and external ear normal.      Left Ear: Tympanic membrane, ear canal and external ear normal.      Nose: Nose normal. No congestion or rhinorrhea.      Mouth/Throat:      Mouth: Mucous membranes are moist.      Pharynx: Oropharynx is clear. No oropharyngeal exudate or posterior oropharyngeal erythema.   Eyes:      General:         Right eye: No discharge.         " Left eye: No discharge.      Conjunctiva/sclera: Conjunctivae normal.   Cardiovascular:      Rate and Rhythm: Normal rate and regular rhythm.      Pulses: Normal pulses.      Heart sounds: Normal heart sounds. No murmur heard.  Pulmonary:      Effort: Pulmonary effort is normal. No respiratory distress.      Breath sounds: Examination of the right-upper field reveals rhonchi. Examination of the left-upper field reveals rhonchi. Rhonchi present. No wheezing or rales.      Comments: Harsh congested cough t/o exam  Chest:      Chest wall: No tenderness.   Musculoskeletal:         General: No swelling or tenderness.      Cervical back: Normal range of motion and neck supple.      Right lower leg: No edema.      Left lower leg: No edema.   Lymphadenopathy:      Cervical: No cervical adenopathy.   Skin:     General: Skin is warm and dry.   Neurological:      General: No focal deficit present.      Mental Status: He is alert and oriented to person, place, and time. Mental status is at baseline.   Psychiatric:         Mood and Affect: Mood normal.         Behavior: Behavior normal.         Thought Content: Thought content normal.         Judgment: Judgment normal.         Assessment/Plan:     Diagnosis and associated orders:   1. Lower respiratory tract infection  azithromycin (ZITHROMAX) 250 MG Tab    promethazine-dextromethorphan (PROMETHAZINE-DM) 6.25-15 MG/5ML syrup      2. Elevated blood pressure reading           Comments/MDM:   Differential diagnosis discussed     PRESENTATION & PERTINENT Hx: Non-toxic appearance, vital signs within normal limits with the exception of elevated BP at 150/70, no hx of htn. He is reporting progressively worsening cough that is now productive with thick purulent sputum.     EXAM: Well appearing, ronchi in upper lobes, harsh cough t/o exam, neuro intact, no adventious heart sounds.  No signs of hypertensive crisis.  Remainder of exam is benign without red flag signs or symptoms    PLAN:  Will treat for bacterial lower respiratory tract infection with azithromycin, will also provide anti tussive. Recommend adequate hydration, rest, deep breathing and coughing, ambulation as tolerated, OTC age appropriate medications    Discussed at length that his BP is elevated, concerned for underlying hypertension with 150/70 reading. Needs to start log and follow up with PCP for potential medication management.     Follow up: With primary care provider       Return to clinic or go to ED if symptoms worsen or persist. Indications for ED discussed at length. Patient/Parent/Guardian voices understanding. Follow-up with your primary care provider in 3-5 days. Red flag symptoms discussed. All side effects of medication discussed including allergic response, GI upset, tendon injury, rash, sedation etc.    I personally reviewed prior external notes and test results pertinent to today's visit as well as additional imaging and testing completed in clinic today.     Please note that this dictation was created using voice recognition software. I have made every reasonable attempt to correct obvious errors, but I expect that there are errors of grammar and possibly content that I did not discover before finalizing the note.    This note was electronically signed by RICH Olvera

## 2023-08-18 NOTE — LETTER
August 18, 2023         Patient: Mikal Bhardwaj   YOB: 1955   Date of Visit: 8/18/2023           To Whom it May Concern:    Mikal Bhardwaj was seen in my clinic on 8/18/2023. He may return to work on 08/21/23, he was not tested for COVID-19 because he is outside of the window for being contagious, he may return to work on 08/21/23.    If you have any questions or concerns, please don't hesitate to call.        Sincerely,           RICH Olvera  Electronically Signed

## 2024-07-22 ENCOUNTER — TELEPHONE (OUTPATIENT)
Dept: HEALTH INFORMATION MANAGEMENT | Facility: OTHER | Age: 69
End: 2024-07-22
Payer: MEDICARE

## 2025-06-26 ENCOUNTER — TELEPHONE (OUTPATIENT)
Dept: HEALTH INFORMATION MANAGEMENT | Facility: OTHER | Age: 70
End: 2025-06-26

## 2025-08-20 VITALS
HEART RATE: 76 BPM | OXYGEN SATURATION: 97 % | TEMPERATURE: 99.2 F | WEIGHT: 138 LBS | DIASTOLIC BLOOD PRESSURE: 58 MMHG | HEIGHT: 65 IN | SYSTOLIC BLOOD PRESSURE: 114 MMHG | BODY MASS INDEX: 22.99 KG/M2

## 2025-08-20 DIAGNOSIS — E78.5 DYSLIPIDEMIA: ICD-10-CM

## 2025-08-20 DIAGNOSIS — Z12.11 SCREEN FOR COLON CANCER: Primary | ICD-10-CM

## 2025-08-20 DIAGNOSIS — I49.9 IRREGULAR HEART RATE: ICD-10-CM

## 2025-08-20 PROCEDURE — G0439 PPPS, SUBSEQ VISIT: HCPCS | Performed by: NURSE PRACTITIONER

## 2025-08-20 PROCEDURE — 1126F AMNT PAIN NOTED NONE PRSNT: CPT | Performed by: NURSE PRACTITIONER

## 2025-08-20 PROCEDURE — 3078F DIAST BP <80 MM HG: CPT | Performed by: NURSE PRACTITIONER

## 2025-08-20 PROCEDURE — 3074F SYST BP LT 130 MM HG: CPT | Performed by: NURSE PRACTITIONER

## 2025-08-20 SDOH — ECONOMIC STABILITY: HOUSING INSECURITY: AT ANY TIME IN THE PAST 12 MONTHS, WERE YOU HOMELESS OR LIVING IN A SHELTER (INCLUDING NOW)?: NO

## 2025-08-20 SDOH — ECONOMIC STABILITY: HOUSING INSECURITY: IN THE PAST 12 MONTHS, HOW MANY TIMES HAVE YOU MOVED WHERE YOU WERE LIVING?: 0

## 2025-08-20 SDOH — ECONOMIC STABILITY: FOOD INSECURITY: WITHIN THE PAST 12 MONTHS, THE FOOD YOU BOUGHT JUST DIDN'T LAST AND YOU DIDN'T HAVE MONEY TO GET MORE.: NEVER TRUE

## 2025-08-20 SDOH — ECONOMIC STABILITY: FOOD INSECURITY: WITHIN THE PAST 12 MONTHS, YOU WORRIED THAT YOUR FOOD WOULD RUN OUT BEFORE YOU GOT THE MONEY TO BUY MORE.: NEVER TRUE

## 2025-08-20 SDOH — ECONOMIC STABILITY: FOOD INSECURITY: HOW HARD IS IT FOR YOU TO PAY FOR THE VERY BASICS LIKE FOOD, HOUSING, MEDICAL CARE, AND HEATING?: NOT VERY HARD

## 2025-08-20 SDOH — ECONOMIC STABILITY: HOUSING INSECURITY: IN THE LAST 12 MONTHS, WAS THERE A TIME WHEN YOU WERE NOT ABLE TO PAY THE MORTGAGE OR RENT ON TIME?: NO

## 2025-08-20 SDOH — ECONOMIC STABILITY: TRANSPORTATION INSECURITY: IN THE PAST 12 MONTHS, HAS LACK OF TRANSPORTATION KEPT YOU FROM MEDICAL APPOINTMENTS OR FROM GETTING MEDICATIONS?: NO

## 2025-08-20 ASSESSMENT — ENCOUNTER SYMPTOMS: GENERAL WELL-BEING: GOOD

## 2025-08-20 ASSESSMENT — ACTIVITIES OF DAILY LIVING (ADL)
LACK_OF_TRANSPORTATION: NO
BATHING_REQUIRES_ASSISTANCE: 0

## 2025-08-20 ASSESSMENT — PATIENT HEALTH QUESTIONNAIRE - PHQ9: CLINICAL INTERPRETATION OF PHQ2 SCORE: 0

## 2025-08-20 ASSESSMENT — LIFESTYLE VARIABLES
HOW OFTEN DO YOU HAVE A DRINK CONTAINING ALCOHOL: MONTHLY OR LESS
HOW OFTEN DO YOU HAVE SIX OR MORE DRINKS ON ONE OCCASION: NEVER

## 2025-08-20 ASSESSMENT — PAIN SCALES - GENERAL: PAINLEVEL_OUTOF10: NO PAIN
